# Patient Record
Sex: FEMALE | Race: WHITE
[De-identification: names, ages, dates, MRNs, and addresses within clinical notes are randomized per-mention and may not be internally consistent; named-entity substitution may affect disease eponyms.]

---

## 2019-09-27 ENCOUNTER — HOSPITAL ENCOUNTER (OUTPATIENT)
Dept: HOSPITAL 94 - SSTAY O | Age: 74
Discharge: HOME | End: 2019-09-27
Attending: INTERNAL MEDICINE
Payer: MEDICARE

## 2019-09-27 VITALS — SYSTOLIC BLOOD PRESSURE: 112 MMHG | DIASTOLIC BLOOD PRESSURE: 70 MMHG

## 2019-09-27 VITALS — SYSTOLIC BLOOD PRESSURE: 102 MMHG | DIASTOLIC BLOOD PRESSURE: 71 MMHG

## 2019-09-27 VITALS — HEIGHT: 62 IN | BODY MASS INDEX: 29.66 KG/M2 | WEIGHT: 161.16 LBS

## 2019-09-27 VITALS — SYSTOLIC BLOOD PRESSURE: 113 MMHG | DIASTOLIC BLOOD PRESSURE: 73 MMHG

## 2019-09-27 VITALS — DIASTOLIC BLOOD PRESSURE: 68 MMHG | SYSTOLIC BLOOD PRESSURE: 112 MMHG

## 2019-09-27 VITALS — DIASTOLIC BLOOD PRESSURE: 66 MMHG | SYSTOLIC BLOOD PRESSURE: 109 MMHG

## 2019-09-27 VITALS — DIASTOLIC BLOOD PRESSURE: 66 MMHG | SYSTOLIC BLOOD PRESSURE: 104 MMHG

## 2019-09-27 VITALS — SYSTOLIC BLOOD PRESSURE: 109 MMHG | DIASTOLIC BLOOD PRESSURE: 71 MMHG

## 2019-09-27 VITALS — SYSTOLIC BLOOD PRESSURE: 138 MMHG | DIASTOLIC BLOOD PRESSURE: 85 MMHG

## 2019-09-27 VITALS — DIASTOLIC BLOOD PRESSURE: 88 MMHG | SYSTOLIC BLOOD PRESSURE: 132 MMHG

## 2019-09-27 VITALS — DIASTOLIC BLOOD PRESSURE: 60 MMHG | SYSTOLIC BLOOD PRESSURE: 102 MMHG

## 2019-09-27 DIAGNOSIS — I25.10: ICD-10-CM

## 2019-09-27 DIAGNOSIS — Z79.899: ICD-10-CM

## 2019-09-27 DIAGNOSIS — R07.89: ICD-10-CM

## 2019-09-27 DIAGNOSIS — R06.00: Primary | ICD-10-CM

## 2019-09-27 DIAGNOSIS — I08.3: ICD-10-CM

## 2019-09-27 DIAGNOSIS — I27.20: ICD-10-CM

## 2019-09-27 LAB
ALBUMIN SERPL BCP-MCNC: 4 G/DL (ref 3.4–5)
ALBUMIN/GLOB SERPL: 1.2 {RATIO} (ref 1.1–1.5)
ALP SERPL-CCNC: 107 IU/L (ref 46–116)
ALT SERPL W P-5'-P-CCNC: 16 U/L (ref 12–78)
ANION GAP SERPL CALCULATED.3IONS-SCNC: 10 MMOL/L (ref 8–16)
AST SERPL W P-5'-P-CCNC: 15 U/L (ref 10–37)
BASOPHILS # BLD AUTO: 0.1 X10'3 (ref 0–0.2)
BASOPHILS NFR BLD AUTO: 0.8 % (ref 0–1)
BILIRUB SERPL-MCNC: 0.6 MG/DL (ref 0.1–1)
BUN SERPL-MCNC: 19 MG/DL (ref 7–18)
BUN/CREAT SERPL: 25.3 (ref 6.6–38)
CALCIUM SERPL-MCNC: 9 MG/DL (ref 8.5–10.1)
CHLORIDE SERPL-SCNC: 107 MMOL/L (ref 99–107)
CO2 SERPL-SCNC: 26.7 MMOL/L (ref 24–32)
CREAT SERPL-MCNC: 0.75 MG/DL (ref 0.4–0.9)
EOSINOPHIL # BLD AUTO: 0.1 X10'3 (ref 0–0.9)
EOSINOPHIL NFR BLD AUTO: 2.2 % (ref 0–6)
ERYTHROCYTE [DISTWIDTH] IN BLOOD BY AUTOMATED COUNT: 14.6 % (ref 11.5–14.5)
GFR SERPL CREATININE-BSD FRML MDRD: 76 ML/MIN
GLUCOSE SERPL-MCNC: 99 MG/DL (ref 70–104)
HCT VFR BLD AUTO: 38 % (ref 35–45)
HGB BLD-MCNC: 12.6 G/DL (ref 12–16)
LYMPHOCYTES # BLD AUTO: 1.5 X10'3 (ref 1.1–4.8)
LYMPHOCYTES NFR BLD AUTO: 24.6 % (ref 21–51)
MAGNESIUM SERPL-MCNC: 1.8 MG/DL (ref 1.5–2.4)
MCH RBC QN AUTO: 30.5 PG (ref 27–31)
MCHC RBC AUTO-ENTMCNC: 33.3 G/DL (ref 33–36.5)
MCV RBC AUTO: 91.8 FL (ref 78–98)
MONOCYTES # BLD AUTO: 0.9 X10'3 (ref 0–0.9)
MONOCYTES NFR BLD AUTO: 14.8 % (ref 2–12)
NEUTROPHILS # BLD AUTO: 3.6 X10'3 (ref 1.8–7.7)
NEUTROPHILS NFR BLD AUTO: 57.6 % (ref 42–75)
PLATELET # BLD AUTO: 248 X10'3 (ref 140–440)
PMV BLD AUTO: 7.5 FL (ref 7.4–10.4)
POTASSIUM SERPL-SCNC: 3.9 MMOL/L (ref 3.5–5.1)
PROT SERPL-MCNC: 7.3 G/DL (ref 6.4–8.2)
RBC # BLD AUTO: 4.13 X10'6 (ref 4.2–5.6)
SODIUM SERPL-SCNC: 144 MMOL/L (ref 135–145)
WBC # BLD AUTO: 6.3 X10'3 (ref 4.5–11)

## 2019-09-27 PROCEDURE — 83735 ASSAY OF MAGNESIUM: CPT

## 2019-09-27 PROCEDURE — 99152 MOD SED SAME PHYS/QHP 5/>YRS: CPT

## 2019-09-27 PROCEDURE — 36415 COLL VENOUS BLD VENIPUNCTURE: CPT

## 2019-09-27 PROCEDURE — 99153 MOD SED SAME PHYS/QHP EA: CPT

## 2019-09-27 PROCEDURE — 80053 COMPREHEN METABOLIC PANEL: CPT

## 2019-09-27 PROCEDURE — 93460 R&L HRT ART/VENTRICLE ANGIO: CPT

## 2019-09-27 PROCEDURE — 85610 PROTHROMBIN TIME: CPT

## 2019-09-27 PROCEDURE — 85025 COMPLETE CBC W/AUTO DIFF WBC: CPT

## 2019-09-27 PROCEDURE — 93005 ELECTROCARDIOGRAM TRACING: CPT

## 2019-09-27 NOTE — NUR
Patient came back from cath lab in stable condition. Per cath lab RN's patient had a 
hematoma after perclose failed, and patient received a femostop. Patients hematoma had 
resolved, however started oozing on the dressing at the first femostop check at 1600. Dr fournier aware, and ordered 15mg of toradol for her back pain at an 8/10, also ordered norco if 
toradol doesnt help. Patient right groin site gauze had some oozing, small hematoma under 
3cm , some bruising around femostop, patient states no pain, soft, nontender at site, vitals 
stable.

## 2019-09-27 NOTE — NUR
Problems reprioritized. Patient report given, questions answered & plan of care reviewed 
with Alysa BERNAL.

## 2019-10-24 ENCOUNTER — HOSPITAL ENCOUNTER (INPATIENT)
Dept: HOSPITAL 94 - MED 3N | Age: 74
LOS: 7 days | Discharge: NURSING, REHAB OR LONG-TERM CARE | DRG: 219 | End: 2019-10-31
Attending: THORACIC SURGERY (CARDIOTHORACIC VASCULAR SURGERY) | Admitting: THORACIC SURGERY (CARDIOTHORACIC VASCULAR SURGERY)
Payer: MEDICARE

## 2019-10-24 VITALS — SYSTOLIC BLOOD PRESSURE: 112 MMHG | DIASTOLIC BLOOD PRESSURE: 66 MMHG

## 2019-10-24 VITALS — DIASTOLIC BLOOD PRESSURE: 58 MMHG | SYSTOLIC BLOOD PRESSURE: 105 MMHG

## 2019-10-24 VITALS — SYSTOLIC BLOOD PRESSURE: 117 MMHG | DIASTOLIC BLOOD PRESSURE: 66 MMHG

## 2019-10-24 VITALS — DIASTOLIC BLOOD PRESSURE: 72 MMHG | SYSTOLIC BLOOD PRESSURE: 129 MMHG

## 2019-10-24 VITALS — HEIGHT: 62 IN | BODY MASS INDEX: 33.47 KG/M2 | WEIGHT: 181.88 LBS

## 2019-10-24 DIAGNOSIS — Z82.49: ICD-10-CM

## 2019-10-24 DIAGNOSIS — Z87.891: ICD-10-CM

## 2019-10-24 DIAGNOSIS — I11.0: ICD-10-CM

## 2019-10-24 DIAGNOSIS — I08.0: Primary | ICD-10-CM

## 2019-10-24 DIAGNOSIS — I27.20: ICD-10-CM

## 2019-10-24 DIAGNOSIS — I25.10: ICD-10-CM

## 2019-10-24 DIAGNOSIS — I48.91: ICD-10-CM

## 2019-10-24 DIAGNOSIS — E78.5: ICD-10-CM

## 2019-10-24 DIAGNOSIS — N17.0: ICD-10-CM

## 2019-10-24 DIAGNOSIS — I42.1: ICD-10-CM

## 2019-10-24 DIAGNOSIS — E66.9: ICD-10-CM

## 2019-10-24 DIAGNOSIS — I50.22: ICD-10-CM

## 2019-10-24 LAB
ALBUMIN SERPL BCP-MCNC: 3.6 G/DL (ref 3.4–5)
ANION GAP SERPL CALCULATED.3IONS-SCNC: 9 MMOL/L (ref 8–16)
APTT PPP: 28 SECONDS (ref 22–32)
BASE EXCESS BLDA CALC-SCNC: -1.4 MMOL/L (ref -2–3)
BODY TEMPERATURE: 37
BUN SERPL-MCNC: 23 MG/DL (ref 7–18)
BUN/CREAT SERPL: 21.9 (ref 6.6–38)
CALCIUM SERPL-MCNC: 8.6 MG/DL (ref 8.5–10.1)
CHLORIDE SERPL-SCNC: 104 MMOL/L (ref 99–107)
CO2 SERPL-SCNC: 27.5 MMOL/L (ref 24–32)
COHGB MFR BLDA: 0.4 % (ref 0.5–1.5)
CREAT SERPL-MCNC: 1.05 MG/DL (ref 0.4–0.9)
ERYTHROCYTE [DISTWIDTH] IN BLOOD BY AUTOMATED COUNT: 14.5 % (ref 11.5–14.5)
GFR SERPL CREATININE-BSD FRML MDRD: 51 ML/MIN
GLUCOSE SERPL-MCNC: 80 MG/DL (ref 70–104)
HBA1C MFR BLD: 5.2 % (ref 4.5–6.2)
HCO3 BLDA-SCNC: 22.3 MMOL/L (ref 22–26)
HCT VFR BLD AUTO: 32.7 % (ref 35–45)
HGB BLD-MCNC: 11.2 G/DL (ref 12–16)
HGB BLDA-MCNC: 12.5 G/DL (ref 12–16)
MCH RBC QN AUTO: 30.8 PG (ref 27–31)
MCHC RBC AUTO-ENTMCNC: 34.3 G/DL (ref 33–36.5)
MCV RBC AUTO: 89.7 FL (ref 78–98)
METHGB MFR BLDA: 0.2 % (ref 0.3–1.12)
O2/TOTAL GAS SETTING VFR VENT: 21 MMHG/%
OXYHGB MFR BLDA: 92.5 % (ref 94–100)
PCO2 TEMP ADJ BLDA: 34.6 MMHG (ref 35–45)
PH TEMP ADJ BLDA: 7.43 [PH] (ref 7.35–7.45)
PLATELET # BLD AUTO: 227 X10'3 (ref 140–440)
PMV BLD AUTO: 7.7 FL (ref 7.4–10.4)
PO2 TEMP ADJ BLD: 66.4 MMHG (ref 83–108)
POTASSIUM SERPL-SCNC: 3.6 MMOL/L (ref 3.5–5.1)
RBC # BLD AUTO: 3.65 X10'6 (ref 4.2–5.6)
SAO2 % BLDA: 93.1 % (ref 95–98)
SODIUM SERPL-SCNC: 140 MMOL/L (ref 135–145)
WBC # BLD AUTO: 9 X10'3 (ref 4.5–11)

## 2019-10-24 PROCEDURE — 93325 DOPPLER ECHO COLOR FLOW MAPG: CPT

## 2019-10-24 PROCEDURE — 84295 ASSAY OF SERUM SODIUM: CPT

## 2019-10-24 PROCEDURE — 93880 EXTRACRANIAL BILAT STUDY: CPT

## 2019-10-24 PROCEDURE — 85018 HEMOGLOBIN: CPT

## 2019-10-24 PROCEDURE — 86901 BLOOD TYPING SEROLOGIC RH(D): CPT

## 2019-10-24 PROCEDURE — 94760 N-INVAS EAR/PLS OXIMETRY 1: CPT

## 2019-10-24 PROCEDURE — 85347 COAGULATION TIME ACTIVATED: CPT

## 2019-10-24 PROCEDURE — 71045 X-RAY EXAM CHEST 1 VIEW: CPT

## 2019-10-24 PROCEDURE — 82947 ASSAY GLUCOSE BLOOD QUANT: CPT

## 2019-10-24 PROCEDURE — 85610 PROTHROMBIN TIME: CPT

## 2019-10-24 PROCEDURE — 82803 BLOOD GASES ANY COMBINATION: CPT

## 2019-10-24 PROCEDURE — 97530 THERAPEUTIC ACTIVITIES: CPT

## 2019-10-24 PROCEDURE — 83735 ASSAY OF MAGNESIUM: CPT

## 2019-10-24 PROCEDURE — 36415 COLL VENOUS BLD VENIPUNCTURE: CPT

## 2019-10-24 PROCEDURE — 93971 EXTREMITY STUDY: CPT

## 2019-10-24 PROCEDURE — 85025 COMPLETE CBC W/AUTO DIFF WBC: CPT

## 2019-10-24 PROCEDURE — 97110 THERAPEUTIC EXERCISES: CPT

## 2019-10-24 PROCEDURE — 94002 VENT MGMT INPAT INIT DAY: CPT

## 2019-10-24 PROCEDURE — 82330 ASSAY OF CALCIUM: CPT

## 2019-10-24 PROCEDURE — 86900 BLOOD TYPING SEROLOGIC ABO: CPT

## 2019-10-24 PROCEDURE — 85027 COMPLETE CBC AUTOMATED: CPT

## 2019-10-24 PROCEDURE — 94668 MNPJ CHEST WALL SBSQ: CPT

## 2019-10-24 PROCEDURE — 84132 ASSAY OF SERUM POTASSIUM: CPT

## 2019-10-24 PROCEDURE — 97116 GAIT TRAINING THERAPY: CPT

## 2019-10-24 PROCEDURE — 83036 HEMOGLOBIN GLYCOSYLATED A1C: CPT

## 2019-10-24 PROCEDURE — 80048 BASIC METABOLIC PNL TOTAL CA: CPT

## 2019-10-24 PROCEDURE — 93005 ELECTROCARDIOGRAM TRACING: CPT

## 2019-10-24 PROCEDURE — 86920 COMPATIBILITY TEST SPIN: CPT

## 2019-10-24 PROCEDURE — 85384 FIBRINOGEN ACTIVITY: CPT

## 2019-10-24 PROCEDURE — 86885 COOMBS TEST INDIRECT QUAL: CPT

## 2019-10-24 PROCEDURE — 84100 ASSAY OF PHOSPHORUS: CPT

## 2019-10-24 PROCEDURE — 93312 ECHO TRANSESOPHAGEAL: CPT

## 2019-10-24 PROCEDURE — 88300 SURGICAL PATH GROSS: CPT

## 2019-10-24 PROCEDURE — 93306 TTE W/DOPPLER COMPLETE: CPT

## 2019-10-24 PROCEDURE — 85730 THROMBOPLASTIN TIME PARTIAL: CPT

## 2019-10-24 PROCEDURE — 80053 COMPREHEN METABOLIC PANEL: CPT

## 2019-10-24 PROCEDURE — 87081 CULTURE SCREEN ONLY: CPT

## 2019-10-24 PROCEDURE — 94003 VENT MGMT INPAT SUBQ DAY: CPT

## 2019-10-24 PROCEDURE — 97161 PT EVAL LOW COMPLEX 20 MIN: CPT

## 2019-10-24 PROCEDURE — 81001 URINALYSIS AUTO W/SCOPE: CPT

## 2019-10-24 PROCEDURE — 82948 REAGENT STRIP/BLOOD GLUCOSE: CPT

## 2019-10-24 PROCEDURE — 36600 WITHDRAWAL OF ARTERIAL BLOOD: CPT

## 2019-10-24 PROCEDURE — 71046 X-RAY EXAM CHEST 2 VIEWS: CPT

## 2019-10-24 PROCEDURE — 82435 ASSAY OF BLOOD CHLORIDE: CPT

## 2019-10-24 RX ADMIN — METOPROLOL TARTRATE SCH MG: 25 TABLET, FILM COATED ORAL at 19:33

## 2019-10-24 NOTE — NUR
Patient in room . I have received report from  Ariela BERNAL  and had the opportunity to 
ask questions and assume patient care.

## 2019-10-24 NOTE — NUR
PAGED VASCULAR

"PLEASE COME FOR VEIN MAPPING AND CAROTID US , 1ST CASE CABG TOMORROW. THANK YOU, 
JAYESH BOLTON "

## 2019-10-24 NOTE — NUR
received pt from admitting ,direct admit to rm.307,oriented to surroundings,Dr. Patel 
notified by phone, vascular paged.

## 2019-10-25 VITALS — SYSTOLIC BLOOD PRESSURE: 108 MMHG | DIASTOLIC BLOOD PRESSURE: 57 MMHG

## 2019-10-25 VITALS — SYSTOLIC BLOOD PRESSURE: 117 MMHG | DIASTOLIC BLOOD PRESSURE: 65 MMHG

## 2019-10-25 VITALS — DIASTOLIC BLOOD PRESSURE: 53 MMHG | SYSTOLIC BLOOD PRESSURE: 98 MMHG

## 2019-10-25 VITALS — SYSTOLIC BLOOD PRESSURE: 106 MMHG | DIASTOLIC BLOOD PRESSURE: 66 MMHG

## 2019-10-25 VITALS — SYSTOLIC BLOOD PRESSURE: 91 MMHG | DIASTOLIC BLOOD PRESSURE: 54 MMHG

## 2019-10-25 VITALS — SYSTOLIC BLOOD PRESSURE: 107 MMHG | DIASTOLIC BLOOD PRESSURE: 51 MMHG

## 2019-10-25 VITALS — SYSTOLIC BLOOD PRESSURE: 116 MMHG | DIASTOLIC BLOOD PRESSURE: 66 MMHG

## 2019-10-25 VITALS — DIASTOLIC BLOOD PRESSURE: 55 MMHG | SYSTOLIC BLOOD PRESSURE: 103 MMHG

## 2019-10-25 VITALS — SYSTOLIC BLOOD PRESSURE: 114 MMHG | DIASTOLIC BLOOD PRESSURE: 56 MMHG

## 2019-10-25 VITALS — SYSTOLIC BLOOD PRESSURE: 118 MMHG | DIASTOLIC BLOOD PRESSURE: 60 MMHG

## 2019-10-25 VITALS — SYSTOLIC BLOOD PRESSURE: 98 MMHG | DIASTOLIC BLOOD PRESSURE: 53 MMHG

## 2019-10-25 VITALS — DIASTOLIC BLOOD PRESSURE: 64 MMHG | SYSTOLIC BLOOD PRESSURE: 116 MMHG

## 2019-10-25 VITALS — DIASTOLIC BLOOD PRESSURE: 61 MMHG | SYSTOLIC BLOOD PRESSURE: 109 MMHG

## 2019-10-25 VITALS — DIASTOLIC BLOOD PRESSURE: 62 MMHG | SYSTOLIC BLOOD PRESSURE: 117 MMHG

## 2019-10-25 VITALS — DIASTOLIC BLOOD PRESSURE: 58 MMHG | SYSTOLIC BLOOD PRESSURE: 113 MMHG

## 2019-10-25 LAB
ACT BLD: 660 SEC (ref 101–148)
ACT BLD: 735 SEC (ref 101–148)
ACT BLD: 755 SEC (ref 101–148)
ACT BLD: 800 SEC (ref 101–148)
ACT BLD: 825 SEC (ref 101–148)
ACT BLD: 845 SEC (ref 101–148)
ALBUMIN SERPL BCP-MCNC: 2.6 G/DL (ref 3.4–5)
ALBUMIN SERPL BCP-MCNC: 3.3 G/DL (ref 3.4–5)
ALBUMIN SERPL BCP-MCNC: 3.7 G/DL (ref 3.4–5)
ALBUMIN/GLOB SERPL: 0.9 {RATIO} (ref 1.1–1.5)
ALBUMIN/GLOB SERPL: 1.3 {RATIO} (ref 1.1–1.5)
ALP SERPL-CCNC: 106 IU/L (ref 46–116)
ALP SERPL-CCNC: 56 IU/L (ref 46–116)
ALT SERPL W P-5'-P-CCNC: 21 U/L (ref 12–78)
ALT SERPL W P-5'-P-CCNC: 27 U/L (ref 12–78)
ANION GAP SERPL CALCULATED.3IONS-SCNC: 10 MMOL/L (ref 8–16)
ANION GAP SERPL CALCULATED.3IONS-SCNC: 11 MMOL/L (ref 8–16)
ANION GAP SERPL CALCULATED.3IONS-SCNC: 7 MMOL/L (ref 8–16)
APTT PPP: 32 SECONDS (ref 22–32)
AST SERPL W P-5'-P-CCNC: 105 U/L (ref 10–37)
AST SERPL W P-5'-P-CCNC: 20 U/L (ref 10–37)
BACTERIA URNS QL MICRO: (no result) /HPF
BASE EXCESS BLDA CALC-SCNC: -0.5 MMOL/L (ref -2–3)
BASE EXCESS BLDA CALC-SCNC: -1.2 MMOL/L (ref -2–3)
BASE EXCESS BLDA CALC-SCNC: -1.5 MMOL/L (ref -2–3)
BASE EXCESS BLDA CALC-SCNC: -1.5 MMOL/L (ref -2–3)
BASE EXCESS BLDA CALC-SCNC: -1.9 MMOL/L (ref -2–3)
BASE EXCESS BLDA CALC-SCNC: -2.6 MMOL/L (ref -2–3)
BASE EXCESS BLDA CALC-SCNC: -3.2 MMOL/L (ref -2–3)
BASE EXCESS BLDA CALC-SCNC: 0.2 MMOL/L (ref -2–3)
BASE EXCESS BLDA CALC-SCNC: 0.7 MMOL/L (ref -2–3)
BASE EXCESS BLDA CALC-SCNC: 1.1 MMOL/L (ref -2–3)
BASE EXCESS BLDV CALC-SCNC: 0.5 MMOL/L
BASOPHILS # BLD AUTO: 0 X10'3 (ref 0–0.2)
BASOPHILS # BLD AUTO: 0 X10'3 (ref 0–0.2)
BASOPHILS # BLD AUTO: 0.1 X10'3 (ref 0–0.2)
BASOPHILS NFR BLD AUTO: 0.1 % (ref 0–1)
BASOPHILS NFR BLD AUTO: 0.2 % (ref 0–1)
BASOPHILS NFR BLD AUTO: 1 % (ref 0–1)
BILIRUB SERPL-MCNC: 0.6 MG/DL (ref 0.1–1)
BILIRUB SERPL-MCNC: 0.6 MG/DL (ref 0.1–1)
BODY TEMPERATURE: 37
BUN SERPL-MCNC: 17 MG/DL (ref 7–18)
BUN SERPL-MCNC: 18 MG/DL (ref 7–18)
BUN SERPL-MCNC: 21 MG/DL (ref 7–18)
BUN/CREAT SERPL: 16.5 (ref 6.6–38)
BUN/CREAT SERPL: 18.1 (ref 6.6–38)
BUN/CREAT SERPL: 19.8 (ref 6.6–38)
CA-I BLD-SCNC: 0.94 MMOL/L (ref 1.03–1.32)
CA-I BLD-SCNC: 0.96 MMOL/L (ref 1.03–1.32)
CA-I BLD-SCNC: 0.97 MMOL/L (ref 1.03–1.32)
CA-I BLD-SCNC: 0.98 MMOL/L (ref 1.03–1.32)
CA-I BLD-SCNC: 1.03 MMOL/L (ref 1.03–1.32)
CA-I BLD-SCNC: 1.11 MMOL/L (ref 1.03–1.32)
CA-I BLD-SCNC: 1.11 MMOL/L (ref 1.03–1.32)
CA-I BLD-SCNC: 1.12 MMOL/L (ref 1.03–1.32)
CA-I BLD-SCNC: 1.19 MMOL/L (ref 1.03–1.32)
CALCIUM SERPL-MCNC: 8.2 MG/DL (ref 8.5–10.1)
CALCIUM SERPL-MCNC: 8.3 MG/DL (ref 8.5–10.1)
CALCIUM SERPL-MCNC: 9.2 MG/DL (ref 8.5–10.1)
CHLORIDE BLD-SCNC: 100 MMOL/L (ref 99–107)
CHLORIDE BLD-SCNC: 101 MMOL/L (ref 99–107)
CHLORIDE BLD-SCNC: 102 MMOL/L (ref 99–107)
CHLORIDE BLD-SCNC: 102 MMOL/L (ref 99–107)
CHLORIDE BLD-SCNC: 105 MMOL/L (ref 99–107)
CHLORIDE SERPL-SCNC: 103 MMOL/L (ref 99–107)
CHLORIDE SERPL-SCNC: 112 MMOL/L (ref 99–107)
CHLORIDE SERPL-SCNC: 113 MMOL/L (ref 99–107)
CLARITY UR: (no result)
CO2 SERPL-SCNC: 22.6 MMOL/L (ref 24–32)
CO2 SERPL-SCNC: 25.1 MMOL/L (ref 24–32)
CO2 SERPL-SCNC: 27.7 MMOL/L (ref 24–32)
COHGB MFR BLDA: 0 % (ref 0.5–1.5)
COHGB MFR BLDA: 0.1 % (ref 0.5–1.5)
COHGB MFR BLDA: 0.2 % (ref 0.5–1.5)
COHGB MFR BLDA: 0.2 % (ref 0.5–1.5)
COHGB MFR BLDA: 0.3 % (ref 0.5–1.5)
COHGB MFR BLDA: 0.4 % (ref 0.5–1.5)
COHGB MFR BLDA: 0.5 % (ref 0.5–1.5)
COHGB MFR BLDA: 1.1 % (ref 0.5–1.5)
COHGB MFR BLDV: 1 %
COLOR UR: YELLOW
CREAT SERPL-MCNC: 0.94 MG/DL (ref 0.4–0.9)
CREAT SERPL-MCNC: 1.06 MG/DL (ref 0.4–0.9)
CREAT SERPL-MCNC: 1.09 MG/DL (ref 0.4–0.9)
DEPRECATED SQUAMOUS URNS QL MICRO: (no result) /LPF
DO-HGB MFR BLDV: 18.1 %
EOSINOPHIL # BLD AUTO: 0 X10'3 (ref 0–0.9)
EOSINOPHIL # BLD AUTO: 0 X10'3 (ref 0–0.9)
EOSINOPHIL # BLD AUTO: 0.1 X10'3 (ref 0–0.9)
EOSINOPHIL NFR BLD AUTO: 0 % (ref 0–6)
EOSINOPHIL NFR BLD AUTO: 0.2 % (ref 0–6)
EOSINOPHIL NFR BLD AUTO: 1.6 % (ref 0–6)
ERYTHROCYTE [DISTWIDTH] IN BLOOD BY AUTOMATED COUNT: 14.9 % (ref 11.5–14.5)
ERYTHROCYTE [DISTWIDTH] IN BLOOD BY AUTOMATED COUNT: 15.2 % (ref 11.5–14.5)
ERYTHROCYTE [DISTWIDTH] IN BLOOD BY AUTOMATED COUNT: 15.6 % (ref 11.5–14.5)
FINE GRAN CASTS URNS QL MICRO: (no result) /LPF
GFR SERPL CREATININE-BSD FRML MDRD: 49 ML/MIN
GFR SERPL CREATININE-BSD FRML MDRD: 51 ML/MIN
GFR SERPL CREATININE-BSD FRML MDRD: 58 ML/MIN
GLUCOSE BLD-MCNC: 104 MG/DL (ref 70–104)
GLUCOSE BLD-MCNC: 116 MG/DL (ref 70–104)
GLUCOSE BLD-MCNC: 124 MG/DL (ref 70–104)
GLUCOSE BLD-MCNC: 126 MG/DL (ref 70–104)
GLUCOSE BLD-MCNC: 127 MG/DL (ref 70–104)
GLUCOSE BLD-MCNC: 130 MG/DL (ref 70–104)
GLUCOSE BLD-MCNC: 150 MG/DL (ref 70–104)
GLUCOSE BLD-MCNC: 81 MG/DL (ref 70–104)
GLUCOSE BLD-MCNC: 90 MG/DL (ref 70–104)
GLUCOSE SERPL-MCNC: 192 MG/DL (ref 70–104)
GLUCOSE SERPL-MCNC: 83 MG/DL (ref 70–104)
GLUCOSE SERPL-MCNC: 97 MG/DL (ref 70–104)
GLUCOSE UR STRIP-MCNC: NEGATIVE MG/DL
HCO3 BLDA-SCNC: 22.3 MMOL/L (ref 22–26)
HCO3 BLDA-SCNC: 22.7 MMOL/L (ref 22–26)
HCO3 BLDA-SCNC: 23.1 MMOL/L (ref 22–26)
HCO3 BLDA-SCNC: 23.1 MMOL/L (ref 22–26)
HCO3 BLDA-SCNC: 24.2 MMOL/L (ref 22–26)
HCO3 BLDA-SCNC: 24.2 MMOL/L (ref 22–26)
HCO3 BLDA-SCNC: 25.1 MMOL/L (ref 22–26)
HCO3 BLDA-SCNC: 25.3 MMOL/L (ref 22–26)
HCO3 BLDV-SCNC: 24.9 MMOL/L
HCT VFR BLD AUTO: 28.6 % (ref 35–45)
HCT VFR BLD AUTO: 29.9 % (ref 35–45)
HCT VFR BLD AUTO: 34.2 % (ref 35–45)
HGB BLD-MCNC: 10.1 G/DL (ref 12–16)
HGB BLD-MCNC: 11.8 G/DL (ref 12–16)
HGB BLD-MCNC: 9.5 G/DL (ref 12–16)
HGB BLDA-MCNC: 10.9 G/DL (ref 12–16)
HGB BLDA-MCNC: 11.2 G/DL (ref 12–16)
HGB BLDA-MCNC: 7.2 G/DL (ref 12–16)
HGB BLDA-MCNC: 7.9 G/DL (ref 12–16)
HGB BLDA-MCNC: 8.8 G/DL (ref 12–16)
HGB BLDA-MCNC: 8.9 G/DL (ref 12–16)
HGB BLDA-MCNC: 9 G/DL (ref 12–16)
HGB BLDA-MCNC: 9.1 G/DL (ref 12–16)
HGB BLDA-MCNC: 9.9 G/DL (ref 12–16)
HGB BLDA-MCNC: 9.9 G/DL (ref 12–16)
HGB UR QL STRIP: (no result)
HYALINE CASTS URNS QL MICRO: (no result) /LPF
KETONES UR STRIP-MCNC: NEGATIVE MG/DL
LEUKOCYTE ESTERASE UR QL STRIP: (no result)
LYMPHOCYTES # BLD AUTO: 0.2 X10'3 (ref 1.1–4.8)
LYMPHOCYTES # BLD AUTO: 0.8 X10'3 (ref 1.1–4.8)
LYMPHOCYTES # BLD AUTO: 1.9 X10'3 (ref 1.1–4.8)
LYMPHOCYTES NFR BLD AUTO: 1.8 % (ref 21–51)
LYMPHOCYTES NFR BLD AUTO: 20.6 % (ref 21–51)
LYMPHOCYTES NFR BLD AUTO: 5.4 % (ref 21–51)
MAGNESIUM SERPL-MCNC: 2.4 MG/DL (ref 1.5–2.4)
MAGNESIUM SERPL-MCNC: 2.7 MG/DL (ref 1.5–2.4)
MCH RBC QN AUTO: 29.7 PG (ref 27–31)
MCH RBC QN AUTO: 30.2 PG (ref 27–31)
MCH RBC QN AUTO: 30.7 PG (ref 27–31)
MCHC RBC AUTO-ENTMCNC: 33.3 G/DL (ref 33–36.5)
MCHC RBC AUTO-ENTMCNC: 33.9 G/DL (ref 33–36.5)
MCHC RBC AUTO-ENTMCNC: 34.4 G/DL (ref 33–36.5)
MCV RBC AUTO: 89 FL (ref 78–98)
MCV RBC AUTO: 89.1 FL (ref 78–98)
MCV RBC AUTO: 89.5 FL (ref 78–98)
METHGB MFR BLD: 0.6 %
METHGB MFR BLDA: 0.2 % (ref 0.3–1.12)
METHGB MFR BLDA: 0.3 % (ref 0.3–1.12)
METHGB MFR BLDA: 0.3 % (ref 0.3–1.12)
METHGB MFR BLDA: 0.7 % (ref 0.3–1.12)
METHGB MFR BLDA: 0.7 % (ref 0.3–1.12)
METHGB MFR BLDA: 0.8 % (ref 0.3–1.12)
METHGB MFR BLDA: 1 % (ref 0.3–1.12)
MONOCYTES # BLD AUTO: 0.6 X10'3 (ref 0–0.9)
MONOCYTES # BLD AUTO: 1.1 X10'3 (ref 0–0.9)
MONOCYTES # BLD AUTO: 1.2 X10'3 (ref 0–0.9)
MONOCYTES NFR BLD AUTO: 12.5 % (ref 2–12)
MONOCYTES NFR BLD AUTO: 4.6 % (ref 2–12)
MONOCYTES NFR BLD AUTO: 8 % (ref 2–12)
NA (ABG): 132 MMOL/L (ref 135–145)
NA (ABG): 133 MMOL/L (ref 135–145)
NA (ABG): 134 MMOL/L (ref 135–145)
NA (ABG): 135 MMOL/L (ref 135–145)
NA (ABG): 136 MMOL/L (ref 135–145)
NA (ABG): 138 MMOL/L (ref 135–145)
NEUTROPHILS # BLD AUTO: 12.5 X10'3 (ref 1.8–7.7)
NEUTROPHILS # BLD AUTO: 13 X10'3 (ref 1.8–7.7)
NEUTROPHILS # BLD AUTO: 5.8 X10'3 (ref 1.8–7.7)
NEUTROPHILS NFR BLD AUTO: 64.3 % (ref 42–75)
NEUTROPHILS NFR BLD AUTO: 86.2 % (ref 42–75)
NEUTROPHILS NFR BLD AUTO: 93.5 % (ref 42–75)
NITRITE UR QL STRIP: NEGATIVE
O2/TOTAL GAS SETTING VFR VENT: 80 MMHG/%
OXYHGB MFR BLDA: 95 % (ref 94–100)
OXYHGB MFR BLDA: 97.6 % (ref 94–100)
OXYHGB MFR BLDA: 97.7 % (ref 94–100)
OXYHGB MFR BLDA: 98.1 % (ref 94–100)
OXYHGB MFR BLDA: 98.1 % (ref 94–100)
OXYHGB MFR BLDA: 98.2 % (ref 94–100)
OXYHGB MFR BLDA: 98.4 % (ref 94–100)
OXYHGB MFR BLDA: 98.7 % (ref 94–100)
OXYHGB MFR BLDA: 98.8 % (ref 94–100)
OXYHGB MFR BLDA: 99.5 % (ref 94–100)
OXYHGB MFR BLDV: 80.3 %
PCO2 BLDA: 30.3 MMHG (ref 35–45)
PCO2 BLDA: 33.8 MMHG (ref 35–45)
PCO2 BLDA: 36.1 MMHG (ref 35–45)
PCO2 BLDA: 36.7 MMHG (ref 35–45)
PCO2 BLDA: 37.8 MMHG (ref 35–45)
PCO2 BLDA: 40.2 MMHG (ref 35–45)
PCO2 BLDA: 41.3 MMHG (ref 35–45)
PCO2 BLDA: 41.8 MMHG (ref 35–45)
PCO2 BLDA: 56.9 MMHG (ref 35–45)
PCO2 BLDV: 38.5 MMHG
PCO2 TEMP ADJ BLDA: 36.3 MMHG (ref 35–45)
PEEP RESPIRATORY: 5 CM H2O
PH BLDA: 7.25 [PH] (ref 7.35–7.45)
PH BLDA: 7.36 [PH] (ref 7.35–7.45)
PH BLDA: 7.38 [PH] (ref 7.35–7.45)
PH BLDA: 7.4 [PH] (ref 7.35–7.45)
PH BLDA: 7.42 [PH] (ref 7.35–7.45)
PH BLDA: 7.42 [PH] (ref 7.35–7.45)
PH BLDA: 7.44 [PH] (ref 7.35–7.45)
PH BLDA: 7.47 [PH] (ref 7.35–7.45)
PH BLDA: 7.48 [PH] (ref 7.35–7.45)
PH TEMP ADJ BLDA: 7.41 [PH] (ref 7.35–7.45)
PH UR STRIP: 5.5 [PH] (ref 4.8–8)
PHOSPHATE SERPL-MCNC: 2.3 MG/DL (ref 2.3–4.5)
PHOSPHATE SERPL-MCNC: 4.2 MG/DL (ref 2.3–4.5)
PLATELET # BLD AUTO: 121 X10'3 (ref 140–440)
PLATELET # BLD AUTO: 132 X10'3 (ref 140–440)
PLATELET # BLD AUTO: 263 X10'3 (ref 140–440)
PMV BLD AUTO: 7.6 FL (ref 7.4–10.4)
PMV BLD AUTO: 7.9 FL (ref 7.4–10.4)
PMV BLD AUTO: 8.3 FL (ref 7.4–10.4)
PO2 BLDA: 128.9 MMHG (ref 60–100)
PO2 BLDA: 257 MMHG (ref 60–100)
PO2 BLDA: 258.2 MMHG (ref 60–100)
PO2 BLDA: 267.3 MMHG (ref 60–100)
PO2 BLDA: 285.4 MMHG (ref 60–100)
PO2 BLDA: 289.3 MMHG (ref 60–100)
PO2 BLDA: 294.1 MMHG (ref 60–100)
PO2 BLDA: 374.6 MMHG (ref 60–100)
PO2 BLDA: 485.6 MMHG (ref 60–100)
PO2 BLDV: 44.9 MMHG
PO2 TEMP ADJ BLD: 78.9 MMHG (ref 83–108)
POTASSIUM BLDA-SCNC: 3.1 MMOL/L (ref 3.3–5.1)
POTASSIUM BLDA-SCNC: 3.4 MMOL/L (ref 3.3–5.1)
POTASSIUM BLDA-SCNC: 3.8 MMOL/L (ref 3.3–5.1)
POTASSIUM BLDA-SCNC: 3.8 MMOL/L (ref 3.3–5.1)
POTASSIUM BLDA-SCNC: 3.9 MMOL/L (ref 3.3–5.1)
POTASSIUM BLDA-SCNC: 4 MMOL/L (ref 3.3–5.1)
POTASSIUM BLDA-SCNC: 4.1 MMOL/L (ref 3.3–5.1)
POTASSIUM BLDA-SCNC: 4.2 MMOL/L (ref 3.3–5.1)
POTASSIUM BLDA-SCNC: 4.6 MMOL/L (ref 3.3–5.1)
POTASSIUM SERPL-SCNC: 3.4 MMOL/L (ref 3.5–5.1)
POTASSIUM SERPL-SCNC: 3.8 MMOL/L (ref 3.5–5.1)
POTASSIUM SERPL-SCNC: 5.7 MMOL/L (ref 3.5–5.1)
PROT SERPL-MCNC: 4.6 G/DL (ref 6.4–8.2)
PROT SERPL-MCNC: 7.6 G/DL (ref 6.4–8.2)
PROT UR QL STRIP: NEGATIVE MG/DL
RBC # BLD AUTO: 3.21 X10'6 (ref 4.2–5.6)
RBC # BLD AUTO: 3.36 X10'6 (ref 4.2–5.6)
RBC # BLD AUTO: 3.83 X10'6 (ref 4.2–5.6)
RBC #/AREA URNS HPF: (no result) /HPF (ref 0–2)
RESP RATE 1H: 12 B/MIN
RESP RATE RESTING: 17 B/MIN
SAO2 % BLDA: 95.5 % (ref 95–98)
SAO2 % BLDA: 98.3 % (ref 95–98)
SAO2 % BLDA: 99.1 % (ref 95–98)
SAO2 % BLDA: 99.1 % (ref 95–98)
SAO2 % BLDA: 99.2 % (ref 95–98)
SAO2 % BLDA: 99.3 % (ref 95–98)
SAO2 % BLDA: 99.5 % (ref 95–98)
SAO2 % BLDA: 99.7 % (ref 95–98)
SODIUM SERPL-SCNC: 139 MMOL/L (ref 135–145)
SODIUM SERPL-SCNC: 146 MMOL/L (ref 135–145)
SODIUM SERPL-SCNC: 147 MMOL/L (ref 135–145)
SP GR UR STRIP: 1.02 (ref 1–1.03)
SPONT VT COMPRES GAS VOL SET UNCOR VENT: 500 ML
TRANS CELLS URNS QL MICRO: (no result) /HPF
URN COLLECT METHOD CLASS: (no result)
UROBILINOGEN UR STRIP-MCNC: 0.2 E.U/DL (ref 0.2–1)
VOL.EXP/M/BW ON VENT: 7 L/MIN
WBC # BLD AUTO: 13.4 X10'3 (ref 4.5–11)
WBC # BLD AUTO: 15.1 X10'3 (ref 4.5–11)
WBC # BLD AUTO: 9 X10'3 (ref 4.5–11)
WBC #/AREA URNS HPF: (no result) /HPF (ref 0–4)

## 2019-10-25 PROCEDURE — 30233R1 TRANSFUSION OF NONAUTOLOGOUS PLATELETS INTO PERIPHERAL VEIN, PERCUTANEOUS APPROACH: ICD-10-PCS | Performed by: THORACIC SURGERY (CARDIOTHORACIC VASCULAR SURGERY)

## 2019-10-25 PROCEDURE — 02L70ZK OCCLUSION OF LEFT ATRIAL APPENDAGE, OPEN APPROACH: ICD-10-PCS | Performed by: THORACIC SURGERY (CARDIOTHORACIC VASCULAR SURGERY)

## 2019-10-25 PROCEDURE — 02HP32Z INSERTION OF MONITORING DEVICE INTO PULMONARY TRUNK, PERCUTANEOUS APPROACH: ICD-10-PCS | Performed by: ANESTHESIOLOGY

## 2019-10-25 PROCEDURE — 027L0ZZ DILATION OF LEFT VENTRICLE, OPEN APPROACH: ICD-10-PCS | Performed by: THORACIC SURGERY (CARDIOTHORACIC VASCULAR SURGERY)

## 2019-10-25 PROCEDURE — 02100Z9 BYPASS CORONARY ARTERY, ONE ARTERY FROM LEFT INTERNAL MAMMARY, OPEN APPROACH: ICD-10-PCS | Performed by: THORACIC SURGERY (CARDIOTHORACIC VASCULAR SURGERY)

## 2019-10-25 PROCEDURE — 02RG08Z REPLACEMENT OF MITRAL VALVE WITH ZOOPLASTIC TISSUE, OPEN APPROACH: ICD-10-PCS | Performed by: THORACIC SURGERY (CARDIOTHORACIC VASCULAR SURGERY)

## 2019-10-25 PROCEDURE — 4A1239Z MONITORING OF CARDIAC OUTPUT, PERCUTANEOUS APPROACH: ICD-10-PCS | Performed by: ANESTHESIOLOGY

## 2019-10-25 PROCEDURE — 30233N1 TRANSFUSION OF NONAUTOLOGOUS RED BLOOD CELLS INTO PERIPHERAL VEIN, PERCUTANEOUS APPROACH: ICD-10-PCS | Performed by: THORACIC SURGERY (CARDIOTHORACIC VASCULAR SURGERY)

## 2019-10-25 PROCEDURE — 02HV33Z INSERTION OF INFUSION DEVICE INTO SUPERIOR VENA CAVA, PERCUTANEOUS APPROACH: ICD-10-PCS | Performed by: ANESTHESIOLOGY

## 2019-10-25 PROCEDURE — B24BZZ4 ULTRASONOGRAPHY OF HEART WITH AORTA, TRANSESOPHAGEAL: ICD-10-PCS | Performed by: THORACIC SURGERY (CARDIOTHORACIC VASCULAR SURGERY)

## 2019-10-25 PROCEDURE — 021309W BYPASS CORONARY ARTERY, FOUR OR MORE ARTERIES FROM AORTA WITH AUTOLOGOUS VENOUS TISSUE, OPEN APPROACH: ICD-10-PCS | Performed by: THORACIC SURGERY (CARDIOTHORACIC VASCULAR SURGERY)

## 2019-10-25 PROCEDURE — B548ZZA ULTRASONOGRAPHY OF SUPERIOR VENA CAVA, GUIDANCE: ICD-10-PCS | Performed by: ANESTHESIOLOGY

## 2019-10-25 PROCEDURE — 4A133B3 MONITORING OF ARTERIAL PRESSURE, PULMONARY, PERCUTANEOUS APPROACH: ICD-10-PCS | Performed by: ANESTHESIOLOGY

## 2019-10-25 PROCEDURE — 06BQ4ZZ EXCISION OF LEFT SAPHENOUS VEIN, PERCUTANEOUS ENDOSCOPIC APPROACH: ICD-10-PCS | Performed by: THORACIC SURGERY (CARDIOTHORACIC VASCULAR SURGERY)

## 2019-10-25 PROCEDURE — 5A1221Z PERFORMANCE OF CARDIAC OUTPUT, CONTINUOUS: ICD-10-PCS | Performed by: THORACIC SURGERY (CARDIOTHORACIC VASCULAR SURGERY)

## 2019-10-25 PROCEDURE — 02RF08Z REPLACEMENT OF AORTIC VALVE WITH ZOOPLASTIC TISSUE, OPEN APPROACH: ICD-10-PCS | Performed by: THORACIC SURGERY (CARDIOTHORACIC VASCULAR SURGERY)

## 2019-10-25 RX ADMIN — DOCUSATE SODIUM SCH MG: 100 CAPSULE, LIQUID FILLED ORAL at 20:00

## 2019-10-25 RX ADMIN — SODIUM CHLORIDE PRN MLS/HR: 0.9 INJECTION, SOLUTION INTRAVENOUS at 15:10

## 2019-10-25 RX ADMIN — METOPROLOL TARTRATE SCH MG: 25 TABLET, FILM COATED ORAL at 07:49

## 2019-10-25 RX ADMIN — POTASSIUM CHLORIDE PRN MLS/HR: 14.9 INJECTION, SOLUTION INTRAVENOUS at 18:39

## 2019-10-25 RX ADMIN — SODIUM CHLORIDE PRN MLS/HR: 0.9 INJECTION, SOLUTION INTRAVENOUS at 15:07

## 2019-10-25 RX ADMIN — POTASSIUM CHLORIDE PRN MLS/HR: 14.9 INJECTION, SOLUTION INTRAVENOUS at 16:52

## 2019-10-25 RX ADMIN — INSULIN LISPRO SCH UNITS: 100 INJECTION, SOLUTION INTRAVENOUS; SUBCUTANEOUS at 16:54

## 2019-10-25 RX ADMIN — Medication SCH MLS/HR: at 16:54

## 2019-10-25 RX ADMIN — VANCOMYCIN HYDROCHLORIDE SCH MLS/HR: 750 INJECTION, POWDER, LYOPHILIZED, FOR SOLUTION INTRAVENOUS at 19:56

## 2019-10-25 RX ADMIN — POTASSIUM CHLORIDE PRN MLS/HR: 14.9 INJECTION, SOLUTION INTRAVENOUS at 15:09

## 2019-10-25 RX ADMIN — MORPHINE SULFATE PRN MG: 4 INJECTION, SOLUTION INTRAMUSCULAR; INTRAVENOUS at 20:31

## 2019-10-25 NOTE — NUR
Received to room 2009, accompanied by MDs  and surgical crew. Placed on ventilator, to 
cardiac monitor, arterial line and PA line pressure monitored. Chest tubes to suction at 20 
cm. Cook cath to gravity drainage. Dressings are dry and intact. See assessment record. All 
vasoactive drugs are infusing via central line.

## 2019-10-25 NOTE — NUR
Patient in room . I have received report from GABE Marcelo and GABE Wheat "O"; had the 
opportunity to ask questions and assume patient care.

## 2019-10-25 NOTE — NUR
Problems reprioritized. Patient report given, questions answered & plan of care reviewed 
with GABE Marino.

## 2019-10-25 NOTE — NUR
Problems reprioritized. Patient report given, questions answered & plan of care reviewed 
with oncoming shift.

## 2019-10-25 NOTE — NUR
I have reviewed and agree with all interventions, assessments performed and documented by 
GABE Ramirez.

## 2019-10-25 NOTE — NUR
Preceptt documentation:

I have reviewed and agree with all interventions, assessments performed and documented by 
GABE Wheat.

## 2019-10-26 VITALS — DIASTOLIC BLOOD PRESSURE: 54 MMHG | SYSTOLIC BLOOD PRESSURE: 108 MMHG

## 2019-10-26 VITALS — DIASTOLIC BLOOD PRESSURE: 51 MMHG | SYSTOLIC BLOOD PRESSURE: 107 MMHG

## 2019-10-26 VITALS — DIASTOLIC BLOOD PRESSURE: 61 MMHG | SYSTOLIC BLOOD PRESSURE: 95 MMHG

## 2019-10-26 VITALS — SYSTOLIC BLOOD PRESSURE: 113 MMHG | DIASTOLIC BLOOD PRESSURE: 65 MMHG

## 2019-10-26 VITALS — DIASTOLIC BLOOD PRESSURE: 58 MMHG | SYSTOLIC BLOOD PRESSURE: 94 MMHG

## 2019-10-26 VITALS — SYSTOLIC BLOOD PRESSURE: 106 MMHG | DIASTOLIC BLOOD PRESSURE: 65 MMHG

## 2019-10-26 VITALS — DIASTOLIC BLOOD PRESSURE: 64 MMHG | SYSTOLIC BLOOD PRESSURE: 109 MMHG

## 2019-10-26 VITALS — DIASTOLIC BLOOD PRESSURE: 54 MMHG | SYSTOLIC BLOOD PRESSURE: 95 MMHG

## 2019-10-26 VITALS — SYSTOLIC BLOOD PRESSURE: 117 MMHG | DIASTOLIC BLOOD PRESSURE: 64 MMHG

## 2019-10-26 VITALS — DIASTOLIC BLOOD PRESSURE: 53 MMHG | SYSTOLIC BLOOD PRESSURE: 100 MMHG

## 2019-10-26 VITALS — DIASTOLIC BLOOD PRESSURE: 55 MMHG | SYSTOLIC BLOOD PRESSURE: 118 MMHG

## 2019-10-26 VITALS — SYSTOLIC BLOOD PRESSURE: 104 MMHG | DIASTOLIC BLOOD PRESSURE: 66 MMHG

## 2019-10-26 VITALS — SYSTOLIC BLOOD PRESSURE: 108 MMHG | DIASTOLIC BLOOD PRESSURE: 53 MMHG

## 2019-10-26 VITALS — SYSTOLIC BLOOD PRESSURE: 111 MMHG | DIASTOLIC BLOOD PRESSURE: 70 MMHG

## 2019-10-26 VITALS — DIASTOLIC BLOOD PRESSURE: 58 MMHG | SYSTOLIC BLOOD PRESSURE: 102 MMHG

## 2019-10-26 VITALS — SYSTOLIC BLOOD PRESSURE: 112 MMHG | DIASTOLIC BLOOD PRESSURE: 63 MMHG

## 2019-10-26 VITALS — SYSTOLIC BLOOD PRESSURE: 130 MMHG | DIASTOLIC BLOOD PRESSURE: 62 MMHG

## 2019-10-26 VITALS — SYSTOLIC BLOOD PRESSURE: 120 MMHG | DIASTOLIC BLOOD PRESSURE: 62 MMHG

## 2019-10-26 VITALS — SYSTOLIC BLOOD PRESSURE: 99 MMHG | DIASTOLIC BLOOD PRESSURE: 60 MMHG

## 2019-10-26 VITALS — SYSTOLIC BLOOD PRESSURE: 93 MMHG | DIASTOLIC BLOOD PRESSURE: 53 MMHG

## 2019-10-26 VITALS — SYSTOLIC BLOOD PRESSURE: 117 MMHG | DIASTOLIC BLOOD PRESSURE: 66 MMHG

## 2019-10-26 VITALS — SYSTOLIC BLOOD PRESSURE: 112 MMHG | DIASTOLIC BLOOD PRESSURE: 67 MMHG

## 2019-10-26 VITALS — SYSTOLIC BLOOD PRESSURE: 107 MMHG | DIASTOLIC BLOOD PRESSURE: 58 MMHG

## 2019-10-26 VITALS — SYSTOLIC BLOOD PRESSURE: 106 MMHG | DIASTOLIC BLOOD PRESSURE: 54 MMHG

## 2019-10-26 LAB
ALBUMIN SERPL BCP-MCNC: 3 G/DL (ref 3.4–5)
ALBUMIN/GLOB SERPL: 1.4 {RATIO} (ref 1.1–1.5)
ALP SERPL-CCNC: 47 IU/L (ref 46–116)
ALT SERPL W P-5'-P-CCNC: 28 U/L (ref 12–78)
ANION GAP SERPL CALCULATED.3IONS-SCNC: 12 MMOL/L (ref 8–16)
APTT PPP: 26 SECONDS (ref 22–32)
AST SERPL W P-5'-P-CCNC: 120 U/L (ref 10–37)
BASE EXCESS BLDA CALC-SCNC: -4.3 MMOL/L (ref -2–3)
BASE EXCESS BLDA CALC-SCNC: -6.7 MMOL/L (ref -2–3)
BASOPHILS # BLD AUTO: 0 X10'3 (ref 0–0.2)
BASOPHILS NFR BLD AUTO: 0 % (ref 0–1)
BILIRUB SERPL-MCNC: 0.3 MG/DL (ref 0.1–1)
BODY TEMPERATURE: 37.5
BODY TEMPERATURE: 37.6
BUN SERPL-MCNC: 16 MG/DL (ref 7–18)
BUN/CREAT SERPL: 13.4 (ref 6.6–38)
CALCIUM SERPL-MCNC: 8.4 MG/DL (ref 8.5–10.1)
CHLORIDE SERPL-SCNC: 114 MMOL/L (ref 99–107)
CO2 SERPL-SCNC: 21.9 MMOL/L (ref 24–32)
COHGB MFR BLDA: 0.2 % (ref 0.5–1.5)
COHGB MFR BLDA: 0.2 % (ref 0.5–1.5)
CREAT SERPL-MCNC: 1.19 MG/DL (ref 0.4–0.9)
EOSINOPHIL # BLD AUTO: 0 X10'3 (ref 0–0.9)
EOSINOPHIL NFR BLD AUTO: 0 % (ref 0–6)
ERYTHROCYTE [DISTWIDTH] IN BLOOD BY AUTOMATED COUNT: 15.8 % (ref 11.5–14.5)
GFR SERPL CREATININE-BSD FRML MDRD: 44 ML/MIN
GLUCOSE SERPL-MCNC: 125 MG/DL (ref 70–104)
HCO3 BLDA-SCNC: 18.2 MMOL/L (ref 22–26)
HCO3 BLDA-SCNC: 21 MMOL/L (ref 22–26)
HCT VFR BLD AUTO: 26.2 % (ref 35–45)
HGB BLD-MCNC: 8.9 G/DL (ref 12–16)
HGB BLDA-MCNC: 10 G/DL (ref 12–16)
HGB BLDA-MCNC: 10 G/DL (ref 12–16)
LYMPHOCYTES # BLD AUTO: 0.6 X10'3 (ref 1.1–4.8)
LYMPHOCYTES NFR BLD AUTO: 5.3 % (ref 21–51)
MAGNESIUM SERPL-MCNC: 2.3 MG/DL (ref 1.5–2.4)
MCH RBC QN AUTO: 30.1 PG (ref 27–31)
MCHC RBC AUTO-ENTMCNC: 33.8 G/DL (ref 33–36.5)
MCV RBC AUTO: 89 FL (ref 78–98)
METHGB MFR BLDA: 0.2 % (ref 0.3–1.12)
METHGB MFR BLDA: 0.3 % (ref 0.3–1.12)
MONOCYTES # BLD AUTO: 0.6 X10'3 (ref 0–0.9)
MONOCYTES NFR BLD AUTO: 5.1 % (ref 2–12)
NEUTROPHILS # BLD AUTO: 9.9 X10'3 (ref 1.8–7.7)
NEUTROPHILS NFR BLD AUTO: 89.6 % (ref 42–75)
O2/TOTAL GAS SETTING VFR VENT: 40 MMHG/%
O2/TOTAL GAS SETTING VFR VENT: 40 MMHG/%
OXYHGB MFR BLDA: 91.6 % (ref 94–100)
OXYHGB MFR BLDA: 94.5 % (ref 94–100)
PCO2 TEMP ADJ BLDA: 34.8 MMHG (ref 35–45)
PCO2 TEMP ADJ BLDA: 40.1 MMHG (ref 35–45)
PEEP RESPIRATORY: 5 CM H2O
PEEP RESPIRATORY: 5 CM H2O
PH TEMP ADJ BLDA: 7.34 [PH] (ref 7.35–7.45)
PH TEMP ADJ BLDA: 7.34 [PH] (ref 7.35–7.45)
PHOSPHATE SERPL-MCNC: 4.4 MG/DL (ref 2.3–4.5)
PLATELET # BLD AUTO: 104 X10'3 (ref 140–440)
PMV BLD AUTO: 8.7 FL (ref 7.4–10.4)
PO2 TEMP ADJ BLD: 73 MMHG (ref 83–108)
PO2 TEMP ADJ BLD: 84.5 MMHG (ref 83–108)
POTASSIUM SERPL-SCNC: 4.7 MMOL/L (ref 3.5–5.1)
PROT SERPL-MCNC: 5.2 G/DL (ref 6.4–8.2)
RBC # BLD AUTO: 2.95 X10'6 (ref 4.2–5.6)
RESP RATE RESTING: 13 B/MIN
RESP RATE RESTING: 20 B/MIN
SAO2 % BLDA: 92.1 % (ref 95–98)
SAO2 % BLDA: 94.9 % (ref 95–98)
SODIUM SERPL-SCNC: 148 MMOL/L (ref 135–145)
VOL.EXP/M/BW ON VENT: 6 L/MIN
VOL.EXP/M/BW ON VENT: 7 L/MIN
WBC # BLD AUTO: 11.1 X10'3 (ref 4.5–11)

## 2019-10-26 RX ADMIN — VANCOMYCIN HYDROCHLORIDE SCH MLS/HR: 750 INJECTION, POWDER, LYOPHILIZED, FOR SOLUTION INTRAVENOUS at 09:13

## 2019-10-26 RX ADMIN — MORPHINE SULFATE PRN MG: 4 INJECTION, SOLUTION INTRAMUSCULAR; INTRAVENOUS at 01:57

## 2019-10-26 RX ADMIN — Medication SCH MLS/HR: at 08:21

## 2019-10-26 RX ADMIN — SODIUM CHLORIDE PRN MLS/HR: 0.9 INJECTION, SOLUTION INTRAVENOUS at 19:58

## 2019-10-26 RX ADMIN — Medication SCH MG: at 09:14

## 2019-10-26 RX ADMIN — INSULIN LISPRO SCH UNITS: 100 INJECTION, SOLUTION INTRAVENOUS; SUBCUTANEOUS at 19:27

## 2019-10-26 RX ADMIN — DOCUSATE SODIUM SCH MG: 100 CAPSULE, LIQUID FILLED ORAL at 19:58

## 2019-10-26 RX ADMIN — MORPHINE SULFATE PRN MG: 4 INJECTION, SOLUTION INTRAMUSCULAR; INTRAVENOUS at 23:39

## 2019-10-26 RX ADMIN — Medication SCH MLS/HR: at 16:05

## 2019-10-26 RX ADMIN — Medication SCH MLS/HR: at 23:40

## 2019-10-26 RX ADMIN — INSULIN LISPRO SCH UNITS: 100 INJECTION, SOLUTION INTRAVENOUS; SUBCUTANEOUS at 09:00

## 2019-10-26 RX ADMIN — Medication SCH MLS/HR: at 00:13

## 2019-10-26 RX ADMIN — INSULIN GLARGINE SCH UNIT: 100 INJECTION, SOLUTION SUBCUTANEOUS at 20:53

## 2019-10-26 RX ADMIN — VANCOMYCIN HYDROCHLORIDE SCH MLS/HR: 750 INJECTION, POWDER, LYOPHILIZED, FOR SOLUTION INTRAVENOUS at 19:58

## 2019-10-26 RX ADMIN — DOCUSATE SODIUM SCH MG: 100 CAPSULE, LIQUID FILLED ORAL at 09:14

## 2019-10-26 NOTE — NUR
Problems reprioritized. Patient report given, questions answered & plan of care reviewed 
with GABE Conley.

## 2019-10-26 NOTE — NUR
Problems reprioritized. Patient report given, questions answered & plan of care reviewed 
with AMY BERNAL.

## 2019-10-26 NOTE — NUR
Quad lumen catheter leaking from under the dressing a very dilute serosanguanuoa fluid.  
Gown saturated, patient said she did not feel it.  I did not note it earlier.  For turning, 
patient was only shifted from right side to back for dinner.  There was no time of tugging 
on this catheter site.  Site is not painful, reddened of edematous.  IVF shut off, the 
draining fluid from under the dressing stops.  Dressing to be changed.  further assessment 
to be done asap

## 2019-10-26 NOTE — NUR
CL Dressing changed.  "Proximal" and "medial-2"ports leak out from under the insertion site 
& at previous location of PA cath.  Those 2 ports taped off and taped so they are not used 
for infusions.  CVP port and medial -1 are working and have blood return.  IVMF and abx in 
medial-1 port.  New dressing is CDI.  Site still remains WNL - no edema, no hematoma, soft, 
no pain.

## 2019-10-26 NOTE — NUR
note pertaining to IV morphine on the emar - the re-assessment was from the administration 
of morphine on the night shift last night.  I needed to remove it from the emar so the 
safety alerts/next med due alert would work on [employer].  I documented on it "non-admin" 
because i was not caring for the patient at that time.



Dr Patel in near 1940.  I updated DR regarding urine output trending down.  Orders for 
Albumin.  Currently patient is not in any distress, denies pain.  Minimal appetite - abut 25 
% of meal.  CT dressings CDI, small amt serosanganous drainage in tubings.  CT #1 - starting 
o/p rome = 85 ml in atrium, CT # 2 starting o/p rome = 160 ml.  Patient encouraged to TCDB.  
IS and flutter valve at bedside.  Patient needs assistance with these devices as well dinner 
tray.  Weak x 4 equally.  Rigth eye twitching continues which is baseline per report.  No 
CP.  VSS, u/o marginal to low, sternal incision LEFTY & CDI - no drainage.  PAcer on, 
connected to external wires.  Rate 60, A output 10 mA, V output - n/a.  # 18 to left wrist 
flushed, clamped and capped.  Right IJ quad lumen - DCI, flushed per protocol.  CVP 
monitoring, L&Z.

## 2019-10-27 VITALS — DIASTOLIC BLOOD PRESSURE: 71 MMHG | SYSTOLIC BLOOD PRESSURE: 120 MMHG

## 2019-10-27 VITALS — DIASTOLIC BLOOD PRESSURE: 67 MMHG | SYSTOLIC BLOOD PRESSURE: 117 MMHG

## 2019-10-27 VITALS — SYSTOLIC BLOOD PRESSURE: 108 MMHG | DIASTOLIC BLOOD PRESSURE: 69 MMHG

## 2019-10-27 VITALS — SYSTOLIC BLOOD PRESSURE: 113 MMHG | DIASTOLIC BLOOD PRESSURE: 68 MMHG

## 2019-10-27 VITALS — SYSTOLIC BLOOD PRESSURE: 131 MMHG | DIASTOLIC BLOOD PRESSURE: 81 MMHG

## 2019-10-27 VITALS — DIASTOLIC BLOOD PRESSURE: 65 MMHG | SYSTOLIC BLOOD PRESSURE: 110 MMHG

## 2019-10-27 VITALS — SYSTOLIC BLOOD PRESSURE: 106 MMHG | DIASTOLIC BLOOD PRESSURE: 71 MMHG

## 2019-10-27 VITALS — SYSTOLIC BLOOD PRESSURE: 113 MMHG | DIASTOLIC BLOOD PRESSURE: 69 MMHG

## 2019-10-27 VITALS — DIASTOLIC BLOOD PRESSURE: 77 MMHG | SYSTOLIC BLOOD PRESSURE: 113 MMHG

## 2019-10-27 VITALS — DIASTOLIC BLOOD PRESSURE: 75 MMHG | SYSTOLIC BLOOD PRESSURE: 125 MMHG

## 2019-10-27 VITALS — DIASTOLIC BLOOD PRESSURE: 73 MMHG | SYSTOLIC BLOOD PRESSURE: 116 MMHG

## 2019-10-27 VITALS — SYSTOLIC BLOOD PRESSURE: 126 MMHG | DIASTOLIC BLOOD PRESSURE: 72 MMHG

## 2019-10-27 VITALS — DIASTOLIC BLOOD PRESSURE: 77 MMHG | SYSTOLIC BLOOD PRESSURE: 137 MMHG

## 2019-10-27 VITALS — SYSTOLIC BLOOD PRESSURE: 118 MMHG | DIASTOLIC BLOOD PRESSURE: 69 MMHG

## 2019-10-27 VITALS — SYSTOLIC BLOOD PRESSURE: 114 MMHG | DIASTOLIC BLOOD PRESSURE: 66 MMHG

## 2019-10-27 VITALS — DIASTOLIC BLOOD PRESSURE: 74 MMHG | SYSTOLIC BLOOD PRESSURE: 104 MMHG

## 2019-10-27 VITALS — DIASTOLIC BLOOD PRESSURE: 68 MMHG | SYSTOLIC BLOOD PRESSURE: 119 MMHG

## 2019-10-27 VITALS — SYSTOLIC BLOOD PRESSURE: 103 MMHG | DIASTOLIC BLOOD PRESSURE: 65 MMHG

## 2019-10-27 VITALS — SYSTOLIC BLOOD PRESSURE: 127 MMHG | DIASTOLIC BLOOD PRESSURE: 74 MMHG

## 2019-10-27 VITALS — DIASTOLIC BLOOD PRESSURE: 87 MMHG | SYSTOLIC BLOOD PRESSURE: 136 MMHG

## 2019-10-27 VITALS — DIASTOLIC BLOOD PRESSURE: 67 MMHG | SYSTOLIC BLOOD PRESSURE: 110 MMHG

## 2019-10-27 VITALS — DIASTOLIC BLOOD PRESSURE: 76 MMHG | SYSTOLIC BLOOD PRESSURE: 128 MMHG

## 2019-10-27 VITALS — DIASTOLIC BLOOD PRESSURE: 68 MMHG | SYSTOLIC BLOOD PRESSURE: 116 MMHG

## 2019-10-27 VITALS — SYSTOLIC BLOOD PRESSURE: 139 MMHG | DIASTOLIC BLOOD PRESSURE: 84 MMHG

## 2019-10-27 LAB
ALBUMIN SERPL BCP-MCNC: 3.2 G/DL (ref 3.4–5)
ANION GAP SERPL CALCULATED.3IONS-SCNC: 8 MMOL/L (ref 8–16)
BASOPHILS # BLD AUTO: 0 X10'3 (ref 0–0.2)
BASOPHILS NFR BLD AUTO: 0.1 % (ref 0–1)
BUN SERPL-MCNC: 29 MG/DL (ref 7–18)
BUN/CREAT SERPL: 25.9 (ref 6.6–38)
CALCIUM SERPL-MCNC: 8.2 MG/DL (ref 8.5–10.1)
CHLORIDE SERPL-SCNC: 106 MMOL/L (ref 99–107)
CO2 SERPL-SCNC: 24 MMOL/L (ref 24–32)
CREAT SERPL-MCNC: 1.12 MG/DL (ref 0.4–0.9)
EOSINOPHIL # BLD AUTO: 0 X10'3 (ref 0–0.9)
EOSINOPHIL NFR BLD AUTO: 0 % (ref 0–6)
ERYTHROCYTE [DISTWIDTH] IN BLOOD BY AUTOMATED COUNT: 15.1 % (ref 11.5–14.5)
GFR SERPL CREATININE-BSD FRML MDRD: 48 ML/MIN
GLUCOSE SERPL-MCNC: 163 MG/DL (ref 70–104)
HCT VFR BLD AUTO: 27.7 % (ref 35–45)
HGB BLD-MCNC: 9.3 G/DL (ref 12–16)
LYMPHOCYTES # BLD AUTO: 1.1 X10'3 (ref 1.1–4.8)
LYMPHOCYTES NFR BLD AUTO: 6.2 % (ref 21–51)
MAGNESIUM SERPL-MCNC: 2.3 MG/DL (ref 1.5–2.4)
MCH RBC QN AUTO: 30.2 PG (ref 27–31)
MCHC RBC AUTO-ENTMCNC: 33.6 G/DL (ref 33–36.5)
MCV RBC AUTO: 90 FL (ref 78–98)
MONOCYTES # BLD AUTO: 2 X10'3 (ref 0–0.9)
MONOCYTES NFR BLD AUTO: 10.7 % (ref 2–12)
NEUTROPHILS # BLD AUTO: 15.2 X10'3 (ref 1.8–7.7)
NEUTROPHILS NFR BLD AUTO: 83 % (ref 42–75)
PHOSPHATE SERPL-MCNC: 4.5 MG/DL (ref 2.3–4.5)
PLATELET # BLD AUTO: 118 X10'3 (ref 140–440)
PMV BLD AUTO: 9.3 FL (ref 7.4–10.4)
POTASSIUM SERPL-SCNC: 5.3 MMOL/L (ref 3.5–5.1)
RBC # BLD AUTO: 3.08 X10'6 (ref 4.2–5.6)
SODIUM SERPL-SCNC: 138 MMOL/L (ref 135–145)
WBC # BLD AUTO: 18.3 X10'3 (ref 4.5–11)

## 2019-10-27 RX ADMIN — DOCUSATE SODIUM SCH MG: 100 CAPSULE, LIQUID FILLED ORAL at 20:30

## 2019-10-27 RX ADMIN — METOPROLOL TARTRATE SCH MG: 25 TABLET, FILM COATED ORAL at 20:31

## 2019-10-27 RX ADMIN — METOPROLOL TARTRATE SCH MG: 25 TABLET, FILM COATED ORAL at 09:23

## 2019-10-27 RX ADMIN — INSULIN LISPRO SCH UNITS: 100 INJECTION, SOLUTION INTRAVENOUS; SUBCUTANEOUS at 09:26

## 2019-10-27 RX ADMIN — Medication SCH MG: at 09:19

## 2019-10-27 RX ADMIN — PANTOPRAZOLE SODIUM SCH MG: 40 TABLET, DELAYED RELEASE ORAL at 09:17

## 2019-10-27 RX ADMIN — INSULIN LISPRO SCH UNITS: 100 INJECTION, SOLUTION INTRAVENOUS; SUBCUTANEOUS at 18:52

## 2019-10-27 RX ADMIN — INSULIN LISPRO SCH UNITS: 100 INJECTION, SOLUTION INTRAVENOUS; SUBCUTANEOUS at 14:01

## 2019-10-27 RX ADMIN — INSULIN GLARGINE SCH UNIT: 100 INJECTION, SOLUTION SUBCUTANEOUS at 20:31

## 2019-10-27 RX ADMIN — DOCUSATE SODIUM SCH MG: 100 CAPSULE, LIQUID FILLED ORAL at 09:19

## 2019-10-27 NOTE — NUR
no changes at central line site.  new pad replaced.  small amount pale sero-sanguanous.  
site WNL otherwise

## 2019-10-27 NOTE — NUR
Patient in room ICU 2045. I have received report from handy jacobson   and had the opportunity to 
ask questions and assume patient care.

## 2019-10-27 NOTE — NUR
PT IN TO TREAT, THEN UP TO CHAIR WITH ASSIST OF PT. WELL OLGA. DENIES NEED FOR PAIN 
MECICATION. EXPLAINED IT BEST TO KEEP PAIN CONTROLLED AND NOT LET IT GET TO HIGH. VERBALIZED 
UNDERSTANDING.

## 2019-10-27 NOTE — NUR
central line continues to ooze slightly.  pad behind patient.  site remains soft, no pain, 
no hematoma, no edema

## 2019-10-28 VITALS — DIASTOLIC BLOOD PRESSURE: 69 MMHG | SYSTOLIC BLOOD PRESSURE: 119 MMHG

## 2019-10-28 VITALS — SYSTOLIC BLOOD PRESSURE: 93 MMHG | DIASTOLIC BLOOD PRESSURE: 66 MMHG

## 2019-10-28 VITALS — SYSTOLIC BLOOD PRESSURE: 144 MMHG | DIASTOLIC BLOOD PRESSURE: 81 MMHG

## 2019-10-28 VITALS — DIASTOLIC BLOOD PRESSURE: 62 MMHG | SYSTOLIC BLOOD PRESSURE: 114 MMHG

## 2019-10-28 VITALS — SYSTOLIC BLOOD PRESSURE: 118 MMHG | DIASTOLIC BLOOD PRESSURE: 71 MMHG

## 2019-10-28 VITALS — DIASTOLIC BLOOD PRESSURE: 91 MMHG | SYSTOLIC BLOOD PRESSURE: 151 MMHG

## 2019-10-28 VITALS — DIASTOLIC BLOOD PRESSURE: 69 MMHG | SYSTOLIC BLOOD PRESSURE: 120 MMHG

## 2019-10-28 VITALS — DIASTOLIC BLOOD PRESSURE: 89 MMHG | SYSTOLIC BLOOD PRESSURE: 152 MMHG

## 2019-10-28 VITALS — DIASTOLIC BLOOD PRESSURE: 69 MMHG | SYSTOLIC BLOOD PRESSURE: 125 MMHG

## 2019-10-28 VITALS — DIASTOLIC BLOOD PRESSURE: 71 MMHG | SYSTOLIC BLOOD PRESSURE: 101 MMHG

## 2019-10-28 VITALS — DIASTOLIC BLOOD PRESSURE: 73 MMHG | SYSTOLIC BLOOD PRESSURE: 122 MMHG

## 2019-10-28 VITALS — SYSTOLIC BLOOD PRESSURE: 125 MMHG | DIASTOLIC BLOOD PRESSURE: 75 MMHG

## 2019-10-28 VITALS — DIASTOLIC BLOOD PRESSURE: 75 MMHG | SYSTOLIC BLOOD PRESSURE: 138 MMHG

## 2019-10-28 VITALS — DIASTOLIC BLOOD PRESSURE: 53 MMHG | SYSTOLIC BLOOD PRESSURE: 116 MMHG

## 2019-10-28 VITALS — SYSTOLIC BLOOD PRESSURE: 129 MMHG | DIASTOLIC BLOOD PRESSURE: 70 MMHG

## 2019-10-28 VITALS — SYSTOLIC BLOOD PRESSURE: 117 MMHG | DIASTOLIC BLOOD PRESSURE: 81 MMHG

## 2019-10-28 VITALS — SYSTOLIC BLOOD PRESSURE: 104 MMHG | DIASTOLIC BLOOD PRESSURE: 77 MMHG

## 2019-10-28 VITALS — SYSTOLIC BLOOD PRESSURE: 115 MMHG | DIASTOLIC BLOOD PRESSURE: 72 MMHG

## 2019-10-28 VITALS — SYSTOLIC BLOOD PRESSURE: 143 MMHG | DIASTOLIC BLOOD PRESSURE: 85 MMHG

## 2019-10-28 LAB
ACT BLD: 116 SEC (ref 101–148)
ACT BLD: 148 SEC (ref 101–148)
ACT BLD: 326 SEC (ref 193–297)
ACT BLD: 481 SEC (ref 260–420)
ALBUMIN SERPL BCP-MCNC: 2.9 G/DL (ref 3.4–5)
ANION GAP SERPL CALCULATED.3IONS-SCNC: 7 MMOL/L (ref 8–16)
BASOPHILS # BLD AUTO: 0 X10'3 (ref 0–0.2)
BASOPHILS NFR BLD AUTO: 0 % (ref 0–1)
BUN SERPL-MCNC: 30 MG/DL (ref 7–18)
BUN/CREAT SERPL: 38 (ref 6.6–38)
CALCIUM SERPL-MCNC: 8.1 MG/DL (ref 8.5–10.1)
CHLORIDE SERPL-SCNC: 106 MMOL/L (ref 99–107)
CO2 SERPL-SCNC: 25.8 MMOL/L (ref 24–32)
CREAT SERPL-MCNC: 0.79 MG/DL (ref 0.4–0.9)
EOSINOPHIL # BLD AUTO: 0 X10'3 (ref 0–0.9)
EOSINOPHIL NFR BLD AUTO: 0 % (ref 0–6)
ERYTHROCYTE [DISTWIDTH] IN BLOOD BY AUTOMATED COUNT: 14.7 % (ref 11.5–14.5)
GFR SERPL CREATININE-BSD FRML MDRD: 71 ML/MIN
GLUCOSE SERPL-MCNC: 111 MG/DL (ref 70–104)
HCT VFR BLD AUTO: 26.3 % (ref 35–45)
HGB BLD-MCNC: 8.9 G/DL (ref 12–16)
LYMPHOCYTES # BLD AUTO: 1.1 X10'3 (ref 1.1–4.8)
LYMPHOCYTES NFR BLD AUTO: 7.8 % (ref 21–51)
MAGNESIUM SERPL-MCNC: 2.4 MG/DL (ref 1.5–2.4)
MCH RBC QN AUTO: 29.9 PG (ref 27–31)
MCHC RBC AUTO-ENTMCNC: 33.7 G/DL (ref 33–36.5)
MCV RBC AUTO: 88.6 FL (ref 78–98)
MONOCYTES # BLD AUTO: 1.7 X10'3 (ref 0–0.9)
MONOCYTES NFR BLD AUTO: 12 % (ref 2–12)
NEUTROPHILS # BLD AUTO: 11.4 X10'3 (ref 1.8–7.7)
NEUTROPHILS NFR BLD AUTO: 80.2 % (ref 42–75)
PHOSPHATE SERPL-MCNC: 3.3 MG/DL (ref 2.3–4.5)
PLATELET # BLD AUTO: 113 X10'3 (ref 140–440)
PMV BLD AUTO: 9.5 FL (ref 7.4–10.4)
POTASSIUM SERPL-SCNC: 4.5 MMOL/L (ref 3.5–5.1)
RBC # BLD AUTO: 2.97 X10'6 (ref 4.2–5.6)
SODIUM SERPL-SCNC: 139 MMOL/L (ref 135–145)
WBC # BLD AUTO: 14.2 X10'3 (ref 4.5–11)

## 2019-10-28 RX ADMIN — POTASSIUM CHLORIDE SCH MEQ: 1500 TABLET, EXTENDED RELEASE ORAL at 19:20

## 2019-10-28 RX ADMIN — PANTOPRAZOLE SODIUM SCH MG: 40 TABLET, DELAYED RELEASE ORAL at 09:41

## 2019-10-28 RX ADMIN — Medication SCH MG: at 09:40

## 2019-10-28 RX ADMIN — METOPROLOL TARTRATE SCH MG: 25 TABLET, FILM COATED ORAL at 09:40

## 2019-10-28 RX ADMIN — POTASSIUM CHLORIDE SCH MEQ: 1500 TABLET, EXTENDED RELEASE ORAL at 09:41

## 2019-10-28 RX ADMIN — DOCUSATE SODIUM SCH MG: 100 CAPSULE, LIQUID FILLED ORAL at 09:41

## 2019-10-28 RX ADMIN — Medication SCH MG: at 19:20

## 2019-10-28 RX ADMIN — INSULIN LISPRO SCH UNITS: 100 INJECTION, SOLUTION INTRAVENOUS; SUBCUTANEOUS at 19:18

## 2019-10-28 RX ADMIN — INSULIN GLARGINE SCH UNIT: 100 INJECTION, SOLUTION SUBCUTANEOUS at 21:00

## 2019-10-28 RX ADMIN — INSULIN LISPRO SCH UNITS: 100 INJECTION, SOLUTION INTRAVENOUS; SUBCUTANEOUS at 09:31

## 2019-10-28 RX ADMIN — DOCUSATE SODIUM SCH MG: 100 CAPSULE, LIQUID FILLED ORAL at 19:20

## 2019-10-28 RX ADMIN — METOPROLOL TARTRATE SCH MG: 25 TABLET, FILM COATED ORAL at 19:23

## 2019-10-28 NOTE — NUR
Patient in room ICU 2045. I have received report from Jarvis BERNAL and had the opportunity to 
ask questions and assume patient care. Patient sleeping comfortably, will continue to 
monitor.

## 2019-10-28 NOTE — NUR
Problems reprioritized. Patient report given, questions answered & plan of care reviewed 
with handy cortes.

## 2019-10-28 NOTE — NUR
Patient in room ICU 2045. I have received report from handy Vitale   and had the opportunity to 
ask questions and assume patient care.

## 2019-10-29 VITALS — DIASTOLIC BLOOD PRESSURE: 79 MMHG | SYSTOLIC BLOOD PRESSURE: 130 MMHG

## 2019-10-29 VITALS — SYSTOLIC BLOOD PRESSURE: 129 MMHG | DIASTOLIC BLOOD PRESSURE: 84 MMHG

## 2019-10-29 VITALS — DIASTOLIC BLOOD PRESSURE: 71 MMHG | SYSTOLIC BLOOD PRESSURE: 99 MMHG

## 2019-10-29 VITALS — DIASTOLIC BLOOD PRESSURE: 87 MMHG | SYSTOLIC BLOOD PRESSURE: 137 MMHG

## 2019-10-29 VITALS — SYSTOLIC BLOOD PRESSURE: 122 MMHG | DIASTOLIC BLOOD PRESSURE: 82 MMHG

## 2019-10-29 LAB
ALBUMIN SERPL BCP-MCNC: 2.9 G/DL (ref 3.4–5)
ANION GAP SERPL CALCULATED.3IONS-SCNC: 4 MMOL/L (ref 8–16)
BASOPHILS # BLD AUTO: 0 X10'3 (ref 0–0.2)
BASOPHILS NFR BLD AUTO: 0.1 % (ref 0–1)
BUN SERPL-MCNC: 23 MG/DL (ref 7–18)
BUN/CREAT SERPL: 30.7 (ref 6.6–38)
CALCIUM SERPL-MCNC: 8 MG/DL (ref 8.5–10.1)
CHLORIDE SERPL-SCNC: 106 MMOL/L (ref 99–107)
CO2 SERPL-SCNC: 28.7 MMOL/L (ref 24–32)
CREAT SERPL-MCNC: 0.75 MG/DL (ref 0.4–0.9)
EOSINOPHIL # BLD AUTO: 0 X10'3 (ref 0–0.9)
EOSINOPHIL NFR BLD AUTO: 0.3 % (ref 0–6)
ERYTHROCYTE [DISTWIDTH] IN BLOOD BY AUTOMATED COUNT: 14.8 % (ref 11.5–14.5)
GFR SERPL CREATININE-BSD FRML MDRD: 76 ML/MIN
GLUCOSE SERPL-MCNC: 104 MG/DL (ref 70–104)
HCT VFR BLD AUTO: 27.2 % (ref 35–45)
HGB BLD-MCNC: 9.1 G/DL (ref 12–16)
LYMPHOCYTES # BLD AUTO: 1.6 X10'3 (ref 1.1–4.8)
LYMPHOCYTES NFR BLD AUTO: 13.9 % (ref 21–51)
MAGNESIUM SERPL-MCNC: 2 MG/DL (ref 1.5–2.4)
MCH RBC QN AUTO: 29.7 PG (ref 27–31)
MCHC RBC AUTO-ENTMCNC: 33.3 G/DL (ref 33–36.5)
MCV RBC AUTO: 89.1 FL (ref 78–98)
MONOCYTES # BLD AUTO: 1.9 X10'3 (ref 0–0.9)
MONOCYTES NFR BLD AUTO: 16.4 % (ref 2–12)
NEUTROPHILS # BLD AUTO: 8 X10'3 (ref 1.8–7.7)
NEUTROPHILS NFR BLD AUTO: 69.3 % (ref 42–75)
PLATELET # BLD AUTO: 150 X10'3 (ref 140–440)
PMV BLD AUTO: 8.7 FL (ref 7.4–10.4)
POTASSIUM SERPL-SCNC: 4.4 MMOL/L (ref 3.5–5.1)
RBC # BLD AUTO: 3.06 X10'6 (ref 4.2–5.6)
SODIUM SERPL-SCNC: 139 MMOL/L (ref 135–145)
WBC # BLD AUTO: 11.6 X10'3 (ref 4.5–11)

## 2019-10-29 RX ADMIN — INSULIN LISPRO SCH UNITS: 100 INJECTION, SOLUTION INTRAVENOUS; SUBCUTANEOUS at 14:19

## 2019-10-29 RX ADMIN — POTASSIUM CHLORIDE PRN MEQ: 1500 TABLET, EXTENDED RELEASE ORAL at 08:03

## 2019-10-29 RX ADMIN — PANTOPRAZOLE SODIUM SCH MG: 40 TABLET, DELAYED RELEASE ORAL at 08:02

## 2019-10-29 RX ADMIN — Medication SCH MG: at 08:01

## 2019-10-29 RX ADMIN — HYDROCODONE BITARTRATE AND ACETAMINOPHEN PRN TAB: 10; 325 TABLET ORAL at 19:45

## 2019-10-29 RX ADMIN — METOPROLOL TARTRATE SCH MG: 25 TABLET, FILM COATED ORAL at 19:44

## 2019-10-29 RX ADMIN — HYDROCODONE BITARTRATE AND ACETAMINOPHEN PRN TAB: 10; 325 TABLET ORAL at 03:47

## 2019-10-29 RX ADMIN — DOCUSATE SODIUM SCH MG: 100 CAPSULE, LIQUID FILLED ORAL at 08:01

## 2019-10-29 RX ADMIN — METOPROLOL TARTRATE SCH MG: 25 TABLET, FILM COATED ORAL at 08:02

## 2019-10-29 RX ADMIN — INSULIN GLARGINE SCH UNIT: 100 INJECTION, SOLUTION SUBCUTANEOUS at 21:00

## 2019-10-29 RX ADMIN — AMIODARONE HYDROCHLORIDE SCH MLS/HR: 1.8 INJECTION, SOLUTION INTRAVENOUS at 18:23

## 2019-10-29 RX ADMIN — DOCUSATE SODIUM SCH MG: 100 CAPSULE, LIQUID FILLED ORAL at 19:45

## 2019-10-29 RX ADMIN — POTASSIUM CHLORIDE SCH MEQ: 1500 TABLET, EXTENDED RELEASE ORAL at 08:01

## 2019-10-29 RX ADMIN — Medication SCH MG: at 19:43

## 2019-10-29 RX ADMIN — POTASSIUM CHLORIDE SCH MEQ: 1500 TABLET, EXTENDED RELEASE ORAL at 19:45

## 2019-10-29 RX ADMIN — MAGNESIUM SULFATE IN WATER PRN MLS/HR: 40 INJECTION, SOLUTION INTRAVENOUS at 10:54

## 2019-10-29 NOTE — NUR
CABG Consult: Pt s/p CABGx5 PO 25-50% avg meals since admit. Pt seen by RD 

for written/verbal CABG/HH diet eds w/ RD contact information provided. Pt 

agrees to cottage cheese and fruit at breakfast as well as cherry greek 

yogurt at dinner; dietary notified. Pt did have episode of N/V after meds 

this AM but now feeling better per pt. LBM 10/28. Will continue to monitor 

for additional protein needs post-op.

Rec:

1. advance diet per MD to regular given low PO hx

2. cottage cheese and fruit at breakfast/cherry greek yogurt at dinners

3. wt per rx

-------------------------------------------------------------------------------

Addendum: 10/29/19 at 1234 by Matthew Mares RD

-------------------------------------------------------------------------------

Amended: Links added.

## 2019-10-29 NOTE — NUR
Patient went into AFIB with rate range of 125-150. Patient asymptomatic, /82, RR 18 
and O2 @ 96% on 2LPM via NC. Called DARY Vernon, received new orders for Amiodarone Drip 
Protocol (bolus and drip). Started Amio bolus at 1815, patient has no s/s of distress. 
GABE Titus resumed care of patient @ 1823.

## 2019-10-29 NOTE — NUR
Patient in room . I have received report from GABE Barajas and had the opportunity to 
ask questions and assume patient care.

## 2019-10-29 NOTE — NUR
Problems reprioritized. Patient report given, questions answered & plan of care reviewed 
with GABE Titus.

## 2019-10-29 NOTE — NUR
Problems reprioritized. Patient report given, questions answered & plan of care reviewed 
with Ariela BERNAL.

## 2019-10-29 NOTE — NUR
Numerous attempts made to wean patient's oxygen during shift; ineffective. Patient desats 
during ambulation to 88% on room air. 1 LPM via NC is ineffective; no change in oxygen 
saturation. O2 at 2 LPM patient able to maintain >92% oxygen saturation.

## 2019-10-29 NOTE — NUR
Patient in room . I have received report from GABE Titus and had the opportunity to 
ask questions and assume patient care.

## 2019-10-30 VITALS — DIASTOLIC BLOOD PRESSURE: 71 MMHG | SYSTOLIC BLOOD PRESSURE: 128 MMHG

## 2019-10-30 VITALS — DIASTOLIC BLOOD PRESSURE: 77 MMHG | SYSTOLIC BLOOD PRESSURE: 126 MMHG

## 2019-10-30 VITALS — DIASTOLIC BLOOD PRESSURE: 78 MMHG | SYSTOLIC BLOOD PRESSURE: 134 MMHG

## 2019-10-30 VITALS — SYSTOLIC BLOOD PRESSURE: 118 MMHG | DIASTOLIC BLOOD PRESSURE: 70 MMHG

## 2019-10-30 VITALS — DIASTOLIC BLOOD PRESSURE: 71 MMHG | SYSTOLIC BLOOD PRESSURE: 119 MMHG

## 2019-10-30 VITALS — SYSTOLIC BLOOD PRESSURE: 110 MMHG | DIASTOLIC BLOOD PRESSURE: 68 MMHG

## 2019-10-30 VITALS — DIASTOLIC BLOOD PRESSURE: 86 MMHG | SYSTOLIC BLOOD PRESSURE: 156 MMHG

## 2019-10-30 VITALS — DIASTOLIC BLOOD PRESSURE: 75 MMHG | SYSTOLIC BLOOD PRESSURE: 131 MMHG

## 2019-10-30 LAB
ALBUMIN SERPL BCP-MCNC: 2.9 G/DL (ref 3.4–5)
ANION GAP SERPL CALCULATED.3IONS-SCNC: 6 MMOL/L (ref 8–16)
BASOPHILS # BLD AUTO: 0 X10'3 (ref 0–0.2)
BASOPHILS NFR BLD AUTO: 0.1 % (ref 0–1)
BUN SERPL-MCNC: 19 MG/DL (ref 7–18)
BUN/CREAT SERPL: 27.1 (ref 6.6–38)
CALCIUM SERPL-MCNC: 8.5 MG/DL (ref 8.5–10.1)
CHLORIDE SERPL-SCNC: 102 MMOL/L (ref 99–107)
CO2 SERPL-SCNC: 29.6 MMOL/L (ref 24–32)
CREAT SERPL-MCNC: 0.7 MG/DL (ref 0.4–0.9)
EOSINOPHIL # BLD AUTO: 0 X10'3 (ref 0–0.9)
EOSINOPHIL NFR BLD AUTO: 0.4 % (ref 0–6)
ERYTHROCYTE [DISTWIDTH] IN BLOOD BY AUTOMATED COUNT: 14.9 % (ref 11.5–14.5)
GFR SERPL CREATININE-BSD FRML MDRD: 82 ML/MIN
GLUCOSE SERPL-MCNC: 123 MG/DL (ref 70–104)
HCT VFR BLD AUTO: 26.6 % (ref 35–45)
HGB BLD-MCNC: 8.9 G/DL (ref 12–16)
LYMPHOCYTES # BLD AUTO: 1.5 X10'3 (ref 1.1–4.8)
LYMPHOCYTES NFR BLD AUTO: 13.8 % (ref 21–51)
MAGNESIUM SERPL-MCNC: 1.9 MG/DL (ref 1.5–2.4)
MCH RBC QN AUTO: 30.3 PG (ref 27–31)
MCHC RBC AUTO-ENTMCNC: 33.7 G/DL (ref 33–36.5)
MCV RBC AUTO: 90.1 FL (ref 78–98)
MONOCYTES # BLD AUTO: 1.7 X10'3 (ref 0–0.9)
MONOCYTES NFR BLD AUTO: 16.1 % (ref 2–12)
NEUTROPHILS # BLD AUTO: 7.5 X10'3 (ref 1.8–7.7)
NEUTROPHILS NFR BLD AUTO: 69.6 % (ref 42–75)
PLATELET # BLD AUTO: 181 X10'3 (ref 140–440)
PLATELET BLD QL SMEAR: NORMAL
PMV BLD AUTO: 8.3 FL (ref 7.4–10.4)
POLYCHROMASIA BLD QL SMEAR: (no result)
POTASSIUM SERPL-SCNC: 4.5 MMOL/L (ref 3.5–5.1)
RBC # BLD AUTO: 2.95 X10'6 (ref 4.2–5.6)
RBC MORPH BLD: (no result)
SODIUM SERPL-SCNC: 138 MMOL/L (ref 135–145)
WBC # BLD AUTO: 10.7 X10'3 (ref 4.5–11)

## 2019-10-30 RX ADMIN — Medication SCH MG: at 08:06

## 2019-10-30 RX ADMIN — AMIODARONE HYDROCHLORIDE SCH MLS/HR: 1.8 INJECTION, SOLUTION INTRAVENOUS at 00:20

## 2019-10-30 RX ADMIN — DOCUSATE SODIUM SCH MG: 100 CAPSULE, LIQUID FILLED ORAL at 19:49

## 2019-10-30 RX ADMIN — Medication SCH MG: at 19:50

## 2019-10-30 RX ADMIN — POTASSIUM CHLORIDE SCH MEQ: 1500 TABLET, EXTENDED RELEASE ORAL at 19:52

## 2019-10-30 RX ADMIN — METOPROLOL TARTRATE SCH MG: 25 TABLET, FILM COATED ORAL at 19:50

## 2019-10-30 RX ADMIN — POTASSIUM CHLORIDE SCH MEQ: 1500 TABLET, EXTENDED RELEASE ORAL at 08:07

## 2019-10-30 RX ADMIN — DOCUSATE SODIUM SCH MG: 100 CAPSULE, LIQUID FILLED ORAL at 08:06

## 2019-10-30 RX ADMIN — PANTOPRAZOLE SODIUM SCH MG: 40 TABLET, DELAYED RELEASE ORAL at 08:05

## 2019-10-30 RX ADMIN — INSULIN GLARGINE SCH UNIT: 100 INJECTION, SOLUTION SUBCUTANEOUS at 23:00

## 2019-10-30 RX ADMIN — METOPROLOL TARTRATE SCH MG: 25 TABLET, FILM COATED ORAL at 08:07

## 2019-10-30 NOTE — NUR
Problems reprioritized. Patient report given, questions answered & plan of care reviewed 
with .handy Bojorquez

## 2019-10-30 NOTE — NUR
Patient in room . I have received report from Ariela BERNAL   and had the opportunity to 
ask questions and assume patient care. Bedside reported. pt watching TV, no distress noted.

## 2019-10-30 NOTE — NUR
Patient in room . I have received report from handy Ortiz    and had the opportunity to 
ask questions and assume patient care.

## 2019-10-31 VITALS — SYSTOLIC BLOOD PRESSURE: 140 MMHG | DIASTOLIC BLOOD PRESSURE: 75 MMHG

## 2019-10-31 VITALS — SYSTOLIC BLOOD PRESSURE: 118 MMHG

## 2019-10-31 VITALS — DIASTOLIC BLOOD PRESSURE: 74 MMHG | SYSTOLIC BLOOD PRESSURE: 125 MMHG

## 2019-10-31 LAB
ALBUMIN SERPL BCP-MCNC: 2.7 G/DL (ref 3.4–5)
ANION GAP SERPL CALCULATED.3IONS-SCNC: 5 MMOL/L (ref 8–16)
BASOPHILS # BLD AUTO: 0 X10'3 (ref 0–0.2)
BASOPHILS NFR BLD AUTO: 0.1 % (ref 0–1)
BUN SERPL-MCNC: 14 MG/DL (ref 7–18)
BUN/CREAT SERPL: 23.3 (ref 6.6–38)
CALCIUM SERPL-MCNC: 8 MG/DL (ref 8.5–10.1)
CHLORIDE SERPL-SCNC: 101 MMOL/L (ref 99–107)
CO2 SERPL-SCNC: 31.2 MMOL/L (ref 24–32)
CREAT SERPL-MCNC: 0.6 MG/DL (ref 0.4–0.9)
EOSINOPHIL # BLD AUTO: 0.1 X10'3 (ref 0–0.9)
EOSINOPHIL NFR BLD AUTO: 0.8 % (ref 0–6)
EOSINOPHIL NFR BLD MANUAL: 2 % (ref 0–6)
ERYTHROCYTE [DISTWIDTH] IN BLOOD BY AUTOMATED COUNT: 14.7 % (ref 11.5–14.5)
GFR SERPL CREATININE-BSD FRML MDRD: > 90 ML/MIN
GLUCOSE SERPL-MCNC: 94 MG/DL (ref 70–104)
HCT VFR BLD AUTO: 27.8 % (ref 35–45)
HGB BLD-MCNC: 9.5 G/DL (ref 12–16)
LYMPHOCYTES # BLD AUTO: 1.7 X10'3 (ref 1.1–4.8)
LYMPHOCYTES NFR BLD AUTO: 15.4 % (ref 21–51)
LYMPHOCYTES NFR BLD MANUAL: 11 % (ref 21–51)
MAGNESIUM SERPL-MCNC: 1.6 MG/DL (ref 1.5–2.4)
MCH RBC QN AUTO: 30 PG (ref 27–31)
MCHC RBC AUTO-ENTMCNC: 34 G/DL (ref 33–36.5)
MCV RBC AUTO: 88.2 FL (ref 78–98)
MONOCYTES # BLD AUTO: 1.9 X10'3 (ref 0–0.9)
MONOCYTES NFR BLD AUTO: 17 % (ref 2–12)
MONOCYTES NFR BLD MANUAL: 15 % (ref 2–12)
NEUTROPHILS # BLD AUTO: 7.6 X10'3 (ref 1.8–7.7)
NEUTROPHILS NFR BLD AUTO: 66.7 % (ref 42–75)
NEUTS SEG NFR BLD MANUAL: 72 % (ref 42–75)
PLATELET # BLD AUTO: 225 X10'3 (ref 140–440)
PLATELET BLD QL SMEAR: NORMAL
PMV BLD AUTO: 7.9 FL (ref 7.4–10.4)
POLYCHROMASIA BLD QL SMEAR: (no result)
POTASSIUM SERPL-SCNC: 3.7 MMOL/L (ref 3.5–5.1)
RBC # BLD AUTO: 3.15 X10'6 (ref 4.2–5.6)
RBC MORPH BLD: (no result)
SCHISTOCYTES BLD QL SMEAR: (no result)
SODIUM SERPL-SCNC: 137 MMOL/L (ref 135–145)
TOTAL CELLS COUNTED FLD: 100
WBC # BLD AUTO: 11.3 X10'3 (ref 4.5–11)

## 2019-10-31 RX ADMIN — Medication SCH MG: at 08:46

## 2019-10-31 RX ADMIN — MAGNESIUM SULFATE IN WATER PRN MLS/HR: 40 INJECTION, SOLUTION INTRAVENOUS at 08:48

## 2019-10-31 RX ADMIN — POTASSIUM CHLORIDE PRN MEQ: 1500 TABLET, EXTENDED RELEASE ORAL at 08:46

## 2019-10-31 RX ADMIN — METOPROLOL TARTRATE SCH MG: 25 TABLET, FILM COATED ORAL at 08:46

## 2019-10-31 RX ADMIN — DOCUSATE SODIUM SCH MG: 100 CAPSULE, LIQUID FILLED ORAL at 08:46

## 2019-10-31 RX ADMIN — POTASSIUM CHLORIDE SCH MEQ: 1500 TABLET, EXTENDED RELEASE ORAL at 08:46

## 2019-10-31 RX ADMIN — PANTOPRAZOLE SODIUM SCH MG: 40 TABLET, DELAYED RELEASE ORAL at 08:44

## 2019-10-31 NOTE — NUR
Stable for transfer per MD orders. All instructions reviewed with patient. PIV discontinued, 
cannula, clean, dry dressing intact. Telemetry monitor discontinued. All belongings 
collected and sent with patient. [facility] personnel wheeled patient out of facility at 1120.

## 2019-10-31 NOTE — NUR
Problems reprioritized. Patient report given,Brisa BERNAL questions answered & plan of care 
reviewed with . bedside report given. no distress noted.

## 2019-10-31 NOTE — NUR
Patient in room . I have received report from GABE Zhao and had the opportunity 
to ask questions and assume patient care.

## 2019-10-31 NOTE — NUR
case management paged: [phone]: OCTAVIO - [facility] transfer papers signed by MD. just need to be faxed. 
ty charge Kendra

## 2019-11-11 ENCOUNTER — HOSPITAL ENCOUNTER (OUTPATIENT)
Dept: HOSPITAL 94 - SSTAY O | Age: 74
Discharge: HOME | End: 2019-11-11
Attending: RADIOLOGY
Payer: MEDICARE

## 2019-11-11 VITALS — DIASTOLIC BLOOD PRESSURE: 82 MMHG | SYSTOLIC BLOOD PRESSURE: 161 MMHG

## 2019-11-11 VITALS — SYSTOLIC BLOOD PRESSURE: 141 MMHG | DIASTOLIC BLOOD PRESSURE: 92 MMHG

## 2019-11-11 VITALS — HEIGHT: 62 IN | WEIGHT: 171.96 LBS | BODY MASS INDEX: 31.64 KG/M2

## 2019-11-11 DIAGNOSIS — I42.1: ICD-10-CM

## 2019-11-11 DIAGNOSIS — I25.119: ICD-10-CM

## 2019-11-11 DIAGNOSIS — I27.29: ICD-10-CM

## 2019-11-11 DIAGNOSIS — I48.91: ICD-10-CM

## 2019-11-11 DIAGNOSIS — E03.9: ICD-10-CM

## 2019-11-11 DIAGNOSIS — Z95.2: ICD-10-CM

## 2019-11-11 DIAGNOSIS — J90: Primary | ICD-10-CM

## 2019-11-11 DIAGNOSIS — Z79.899: ICD-10-CM

## 2019-11-11 DIAGNOSIS — Z87.891: ICD-10-CM

## 2019-11-11 DIAGNOSIS — E78.5: ICD-10-CM

## 2019-11-11 DIAGNOSIS — Z95.5: ICD-10-CM

## 2019-11-11 LAB
BASOPHILS # BLD AUTO: 0.1 X10'3 (ref 0–0.2)
BASOPHILS NFR BLD AUTO: 0.6 % (ref 0–1)
EOSINOPHIL # BLD AUTO: 0.1 X10'3 (ref 0–0.9)
EOSINOPHIL NFR BLD AUTO: 0.5 % (ref 0–6)
ERYTHROCYTE [DISTWIDTH] IN BLOOD BY AUTOMATED COUNT: 16 % (ref 11.5–14.5)
HCT VFR BLD AUTO: 28 % (ref 35–45)
HGB BLD-MCNC: 9.5 G/DL (ref 12–16)
LYMPHOCYTES # BLD AUTO: 0.9 X10'3 (ref 1.1–4.8)
LYMPHOCYTES NFR BLD AUTO: 8.2 % (ref 21–51)
MCH RBC QN AUTO: 29.9 PG (ref 27–31)
MCHC RBC AUTO-ENTMCNC: 33.9 G/DL (ref 33–36.5)
MCV RBC AUTO: 88.1 FL (ref 78–98)
MONOCYTES # BLD AUTO: 1.2 X10'3 (ref 0–0.9)
MONOCYTES NFR BLD AUTO: 10.7 % (ref 2–12)
NEUTROPHILS # BLD AUTO: 8.9 X10'3 (ref 1.8–7.7)
NEUTROPHILS NFR BLD AUTO: 80 % (ref 42–75)
PLATELET # BLD AUTO: 441 X10'3 (ref 140–440)
PMV BLD AUTO: 7.1 FL (ref 7.4–10.4)
RBC # BLD AUTO: 3.17 X10'6 (ref 4.2–5.6)
WBC # BLD AUTO: 11.1 X10'3 (ref 4.5–11)

## 2019-11-11 PROCEDURE — 49083 ABD PARACENTESIS W/IMAGING: CPT

## 2019-11-11 PROCEDURE — 32555 ASPIRATE PLEURA W/ IMAGING: CPT

## 2019-11-11 PROCEDURE — 36415 COLL VENOUS BLD VENIPUNCTURE: CPT

## 2019-11-11 PROCEDURE — 85025 COMPLETE CBC W/AUTO DIFF WBC: CPT

## 2019-11-11 PROCEDURE — 71045 X-RAY EXAM CHEST 1 VIEW: CPT
